# Patient Record
Sex: MALE
[De-identification: names, ages, dates, MRNs, and addresses within clinical notes are randomized per-mention and may not be internally consistent; named-entity substitution may affect disease eponyms.]

---

## 2020-06-18 ENCOUNTER — NURSE TRIAGE (OUTPATIENT)
Dept: OTHER | Facility: CLINIC | Age: 55
End: 2020-06-18

## 2023-02-02 PROBLEM — E55.9 VITAMIN D DEFICIENCY: Status: ACTIVE | Noted: 2023-02-02

## 2023-02-02 PROBLEM — R25.2 SPASTICITY: Status: ACTIVE | Noted: 2023-02-02

## 2023-02-02 PROBLEM — N18.6 END STAGE RENAL DISEASE (MULTI): Status: ACTIVE | Noted: 2023-02-02

## 2023-02-02 PROBLEM — N13.8 BPH WITH OBSTRUCTION/LOWER URINARY TRACT SYMPTOMS: Status: ACTIVE | Noted: 2023-02-02

## 2023-02-02 PROBLEM — R26.81 GAIT INSTABILITY: Status: ACTIVE | Noted: 2023-02-02

## 2023-02-02 PROBLEM — Z94.0 KIDNEY REPLACED BY TRANSPLANT (HHS-HCC): Status: ACTIVE | Noted: 2023-02-02

## 2023-02-02 PROBLEM — M25.562 ACUTE PAIN OF LEFT KNEE: Status: ACTIVE | Noted: 2023-02-02

## 2023-02-02 PROBLEM — B02.30 HERPES ZOSTER WITH OPHTHALMIC COMPLICATION: Status: ACTIVE | Noted: 2023-02-02

## 2023-02-02 PROBLEM — I10 BENIGN ESSENTIAL HTN: Status: ACTIVE | Noted: 2023-02-02

## 2023-02-02 PROBLEM — R42 VERTIGO: Status: ACTIVE | Noted: 2023-02-02

## 2023-02-02 PROBLEM — R94.5 ABNORMAL LIVER FUNCTION: Status: ACTIVE | Noted: 2023-02-02

## 2023-02-02 PROBLEM — F41.9 ACUTE ANXIETY: Status: ACTIVE | Noted: 2023-02-02

## 2023-02-02 PROBLEM — D84.9 IMMUNOSUPPRESSION (MULTI): Status: ACTIVE | Noted: 2023-02-02

## 2023-02-02 PROBLEM — K11.9 LESION OF PAROTID GLAND: Status: ACTIVE | Noted: 2023-02-02

## 2023-02-02 PROBLEM — E23.7 PITUITARY ABNORMALITY (MULTI): Status: ACTIVE | Noted: 2023-02-02

## 2023-02-02 PROBLEM — G20.C PARKINSONISM (MULTI): Status: ACTIVE | Noted: 2023-02-02

## 2023-02-02 PROBLEM — N39.0 ACUTE URINARY TRACT INFECTION: Status: ACTIVE | Noted: 2023-02-02

## 2023-02-02 PROBLEM — E23.6 PITUITARY MASS (MULTI): Status: ACTIVE | Noted: 2023-02-02

## 2023-02-02 PROBLEM — R26.89 LOSS OF BALANCE: Status: ACTIVE | Noted: 2023-02-02

## 2023-02-02 PROBLEM — G95.9 MYELOPATHY (MULTI): Status: ACTIVE | Noted: 2023-02-02

## 2023-02-02 PROBLEM — M54.17 LUMBOSACRAL RADICULOPATHY AT L2: Status: ACTIVE | Noted: 2023-02-02

## 2023-02-02 PROBLEM — M10.9 GOUT: Status: ACTIVE | Noted: 2023-02-02

## 2023-02-02 PROBLEM — K21.9 GERD (GASTROESOPHAGEAL REFLUX DISEASE): Status: ACTIVE | Noted: 2023-02-02

## 2023-02-02 PROBLEM — M25.551 RIGHT HIP PAIN: Status: ACTIVE | Noted: 2023-02-02

## 2023-02-02 PROBLEM — K11.20 SUBMANDIBULAR SIALOADENITIS: Status: ACTIVE | Noted: 2023-02-02

## 2023-02-02 PROBLEM — D86.9 SARCOIDOSIS: Status: ACTIVE | Noted: 2023-02-02

## 2023-02-02 PROBLEM — N40.1 BPH WITH OBSTRUCTION/LOWER URINARY TRACT SYMPTOMS: Status: ACTIVE | Noted: 2023-02-02

## 2023-02-02 PROBLEM — M17.12 PRIMARY OSTEOARTHRITIS OF LEFT KNEE: Status: ACTIVE | Noted: 2023-02-02

## 2023-02-02 PROBLEM — K11.5 PAROTID SIALOLITHIASIS: Status: ACTIVE | Noted: 2023-02-02

## 2023-02-02 PROBLEM — N25.81 SECONDARY HYPERPARATHYROIDISM, RENAL (MULTI): Status: ACTIVE | Noted: 2023-02-02

## 2023-02-02 PROBLEM — M54.17 LUMBOSACRAL RADICULOPATHY AT L5: Status: ACTIVE | Noted: 2023-02-02

## 2023-02-02 PROBLEM — N52.9 ERECTILE DYSFUNCTION: Status: ACTIVE | Noted: 2023-02-02

## 2023-02-02 PROBLEM — R39.11 URINARY HESITANCY: Status: ACTIVE | Noted: 2023-02-02

## 2023-02-02 PROBLEM — R29.898 WEAKNESS OF BOTH LOWER EXTREMITIES: Status: ACTIVE | Noted: 2023-02-02

## 2023-02-02 PROBLEM — G62.9 AXONAL POLYNEUROPATHY: Status: ACTIVE | Noted: 2023-02-02

## 2023-02-02 PROBLEM — E83.52 HYPERCALCEMIA: Status: ACTIVE | Noted: 2023-02-02

## 2023-02-02 PROBLEM — D86.89 SARCOID NEUROPATHY: Status: ACTIVE | Noted: 2023-02-02

## 2023-02-27 LAB
ALBUMIN (G/DL) IN SER/PLAS: 3.9 G/DL (ref 3.4–5)
ANION GAP IN SER/PLAS: 14 MMOL/L (ref 10–20)
CALCIUM (MG/DL) IN SER/PLAS: 10.7 MG/DL (ref 8.6–10.6)
CARBON DIOXIDE, TOTAL (MMOL/L) IN SER/PLAS: 28 MMOL/L (ref 21–32)
CHLORIDE (MMOL/L) IN SER/PLAS: 102 MMOL/L (ref 98–107)
CREATININE (MG/DL) IN SER/PLAS: 0.94 MG/DL (ref 0.5–1.3)
CREATININE (MG/DL) IN URINE: 86.6 MG/DL (ref 20–370)
CYCLOSPORINE (NG/L) IN BLOOD: 91 NG/ML (ref 80–210)
ERYTHROCYTE DISTRIBUTION WIDTH (RATIO) BY AUTOMATED COUNT: 13.7 % (ref 11.5–14.5)
ERYTHROCYTE MEAN CORPUSCULAR HEMOGLOBIN CONCENTRATION (G/DL) BY AUTOMATED: 31.8 G/DL (ref 32–36)
ERYTHROCYTE MEAN CORPUSCULAR VOLUME (FL) BY AUTOMATED COUNT: 99 FL (ref 80–100)
ERYTHROCYTES (10*6/UL) IN BLOOD BY AUTOMATED COUNT: 4.37 X10E12/L (ref 4.5–5.9)
GFR MALE: >90 ML/MIN/1.73M2
GLUCOSE (MG/DL) IN SER/PLAS: 99 MG/DL (ref 74–99)
HEMATOCRIT (%) IN BLOOD BY AUTOMATED COUNT: 43.4 % (ref 41–52)
HEMOGLOBIN (G/DL) IN BLOOD: 13.8 G/DL (ref 13.5–17.5)
LEUKOCYTES (10*3/UL) IN BLOOD BY AUTOMATED COUNT: 5.6 X10E9/L (ref 4.4–11.3)
NRBC (PER 100 WBCS) BY AUTOMATED COUNT: 0 /100 WBC (ref 0–0)
PHOSPHATE (MG/DL) IN SER/PLAS: 3.3 MG/DL (ref 2.5–4.9)
PLATELETS (10*3/UL) IN BLOOD AUTOMATED COUNT: 337 X10E9/L (ref 150–450)
POTASSIUM (MMOL/L) IN SER/PLAS: 4 MMOL/L (ref 3.5–5.3)
PROTEIN (MG/DL) IN URINE: 12 MG/DL (ref 5–25)
PROTEIN/CREATININE (MG/MG) IN URINE: 0.14 MG/MG CREAT (ref 0–0.17)
SODIUM (MMOL/L) IN SER/PLAS: 140 MMOL/L (ref 136–145)
UREA NITROGEN (MG/DL) IN SER/PLAS: 13 MG/DL (ref 6–23)

## 2023-03-16 ENCOUNTER — OFFICE VISIT (OUTPATIENT)
Dept: PRIMARY CARE | Facility: CLINIC | Age: 58
End: 2023-03-16
Payer: COMMERCIAL

## 2023-03-16 VITALS
BODY MASS INDEX: 24.17 KG/M2 | DIASTOLIC BLOOD PRESSURE: 89 MMHG | WEIGHT: 154 LBS | SYSTOLIC BLOOD PRESSURE: 141 MMHG | HEART RATE: 71 BPM | HEIGHT: 67 IN

## 2023-03-16 DIAGNOSIS — D84.9 IMMUNOSUPPRESSION (MULTI): ICD-10-CM

## 2023-03-16 DIAGNOSIS — I10 BENIGN ESSENTIAL HTN: ICD-10-CM

## 2023-03-16 DIAGNOSIS — G62.9 AXONAL POLYNEUROPATHY: ICD-10-CM

## 2023-03-16 DIAGNOSIS — D86.9 SARCOIDOSIS: ICD-10-CM

## 2023-03-16 DIAGNOSIS — Z94.0 KIDNEY REPLACED BY TRANSPLANT (HHS-HCC): ICD-10-CM

## 2023-03-16 DIAGNOSIS — N40.1 BPH WITH OBSTRUCTION/LOWER URINARY TRACT SYMPTOMS: ICD-10-CM

## 2023-03-16 DIAGNOSIS — Z12.11 SCREEN FOR COLON CANCER: Primary | ICD-10-CM

## 2023-03-16 DIAGNOSIS — R29.898 WEAKNESS OF BOTH LOWER EXTREMITIES: ICD-10-CM

## 2023-03-16 DIAGNOSIS — N13.8 BPH WITH OBSTRUCTION/LOWER URINARY TRACT SYMPTOMS: ICD-10-CM

## 2023-03-16 PROCEDURE — 99214 OFFICE O/P EST MOD 30 MIN: CPT | Performed by: FAMILY MEDICINE

## 2023-03-16 PROCEDURE — 1036F TOBACCO NON-USER: CPT | Performed by: FAMILY MEDICINE

## 2023-03-16 PROCEDURE — 3077F SYST BP >= 140 MM HG: CPT | Performed by: FAMILY MEDICINE

## 2023-03-16 PROCEDURE — 3079F DIAST BP 80-89 MM HG: CPT | Performed by: FAMILY MEDICINE

## 2023-03-18 PROBLEM — Z12.11 SCREEN FOR COLON CANCER: Status: ACTIVE | Noted: 2023-03-18

## 2023-03-18 ASSESSMENT — ENCOUNTER SYMPTOMS
FATIGUE: 1
GASTROINTESTINAL NEGATIVE: 1
LIGHT-HEADEDNESS: 1
FREQUENCY: 1
JOINT SWELLING: 1
WEAKNESS: 1
HEMATOLOGIC/LYMPHATIC NEGATIVE: 1
DIZZINESS: 1
DYSURIA: 1
RESPIRATORY NEGATIVE: 1
TREMORS: 1
PSYCHIATRIC NEGATIVE: 1
EYES NEGATIVE: 1

## 2023-03-18 NOTE — PROGRESS NOTES
"Subjective   Patient ID: Mehul Meza is a 57 y.o. male who presents for Follow-up and Joint Swelling.  Today he is accompanied by alone.     Has had edema Had LP by neuro. Due for colonosocpy and shingrix  Difficulty walking feeling weakness balance getting worse.   Saw urology on Tamsulosin now which is helping. Occ dizziness.     Current Outpatient Medications on File Prior to Visit   Medication Sig Dispense Refill   • baclofen (Lioresal) 20 mg tablet Take 1 tablet (20 mg) by mouth in the morning and 1 tablet (20 mg) in the evening and 1 tablet (20 mg) before bedtime.     • cycloSPORINE modified (NEOral) 25 mg capsule Take 3 capsules (75 mg) by mouth in the morning and 3 capsules (75 mg) in the evening.     • ergocalciferol (Vitamin D-2) 1.25 MG (05551 UT) capsule Take 1 capsule (1,250 mcg) by mouth every 30 (thirty) days.     • mycophenolate (Cellcept) 250 mg capsule Take 2 capsules (500 mg) by mouth in the morning and 2 capsules (500 mg) before bedtime.     • nadolol (Corgard) 20 mg tablet Take 1 tablet (20 mg) by mouth once daily.     • predniSONE (Deltasone) 5 mg tablet Take 1 tablet (5 mg) by mouth once daily.     • tamsulosin (Flomax) 0.4 mg 24 hr capsule Take 1 capsule (0.4 mg) by mouth once daily at bedtime.       No current facility-administered medications on file prior to visit.        Review of Systems   Constitutional:  Positive for fatigue.   HENT: Negative.     Eyes: Negative.    Respiratory: Negative.     Cardiovascular:  Positive for leg swelling.   Gastrointestinal: Negative.    Genitourinary:  Positive for dysuria and frequency.   Musculoskeletal:  Positive for joint swelling.   Neurological:  Positive for dizziness, tremors, weakness and light-headedness.   Hematological: Negative.    Psychiatric/Behavioral: Negative.         Objective   /89   Pulse 71   Ht 1.702 m (5' 7\")   Wt 69.9 kg (154 lb)   BMI 24.12 kg/m²   BSA: 1.82 meters squared  Growth percentiles: Facility age limit " for growth %brandie is 20 years. Facility age limit for growth %brandie is 20 years.   No visits with results within 1 Week(s) from this visit.   Latest known visit with results is:   Orders Only on 02/27/2023   Component Date Value Ref Range Status   • Glucose 02/27/2023 99  74 - 99 mg/dL Final   • Sodium 02/27/2023 140  136 - 145 mmol/L Final   • Potassium 02/27/2023 4.0  3.5 - 5.3 mmol/L Final   • Chloride 02/27/2023 102  98 - 107 mmol/L Final   • Bicarbonate 02/27/2023 28  21 - 32 mmol/L Final   • Anion Gap 02/27/2023 14  10 - 20 mmol/L Final   • Urea Nitrogen 02/27/2023 13  6 - 23 mg/dL Final   • Creatinine 02/27/2023 0.94  0.50 - 1.30 mg/dL Final   • GFR MALE 02/27/2023 >90  >90 mL/min/1.73m2 Final    Comment:  CALCULATIONS OF ESTIMATED GFR ARE PERFORMED   USING THE 2021 CKD-EPI STUDY REFIT EQUATION   WITHOUT THE RACE VARIABLE FOR THE IDMS-TRACEABLE   CREATININE METHODS.    https://jasn.asnjournals.org/content/early/2021/09/22/ASN.7303340205     • Calcium 02/27/2023 10.7 (H)  8.6 - 10.6 mg/dL Final   • Phosphorus 02/27/2023 3.3  2.5 - 4.9 mg/dL Final    Comment:  The performance characteristics of phosphorus testing in   heparinized plasma have been validated by the individual     laboratory site where testing is performed. Testing    on heparinized plasma is not approved by the FDA;    however, such approval is not necessary.     • Albumin 02/27/2023 3.9  3.4 - 5.0 g/dL Final      Physical Exam  Vitals and nursing note reviewed.   Constitutional:       Appearance: Normal appearance.   HENT:      Head: Normocephalic and atraumatic.      Right Ear: Tympanic membrane normal.      Left Ear: Tympanic membrane normal.      Nose: Nose normal.      Mouth/Throat:      Mouth: Mucous membranes are moist.   Eyes:      Pupils: Pupils are equal, round, and reactive to light.   Cardiovascular:      Rate and Rhythm: Normal rate and regular rhythm.      Pulses: Normal pulses.      Heart sounds: Normal heart sounds.   Pulmonary:       Effort: Pulmonary effort is normal.      Breath sounds: Normal breath sounds.   Abdominal:      General: Abdomen is flat. Bowel sounds are normal.      Palpations: Abdomen is soft.   Musculoskeletal:      Cervical back: Normal range of motion and neck supple.   Skin:     General: Skin is warm and dry.      Capillary Refill: Capillary refill takes less than 2 seconds.   Neurological:      Mental Status: He is alert and oriented to person, place, and time.      Motor: Weakness present.      Gait: Gait abnormal.   Psychiatric:         Mood and Affect: Mood normal.       Assessment/Plan   Problem List Items Addressed This Visit          Nervous    Axonal polyneuropathy     C/w follow with neurology         Weakness of both lower extremities     Unclear etiology . Work up by neurology            Circulatory    Benign essential HTN     C/w meds BP controlled. Limit sodim increase exercise.            Genitourinary    BPH with obstruction/lower urinary tract symptoms     Tamsulosin in AM working better for patient.             Infectious/Inflammatory    Sarcoidosis       Other    Kidney replaced by transplant     Other Visit Diagnoses       Screen for colon cancer    -  Primary    Relevant Orders    Colonoscopy          [unfilled]    Assessment/Plan   Problem List Items Addressed This Visit          Nervous    Axonal polyneuropathy     C/w follow with neurology         Weakness of both lower extremities     Unclear etiology . Work up by neurology            Circulatory    Benign essential HTN     C/w meds BP controlled. Limit sodim increase exercise.            Genitourinary    BPH with obstruction/lower urinary tract symptoms     Tamsulosin in AM working better for patient.             Infectious/Inflammatory    Sarcoidosis       Other    Kidney replaced by transplant     Other Visit Diagnoses       Screen for colon cancer    -  Primary    Relevant Orders    Colonoscopy

## 2023-04-28 DIAGNOSIS — I10 BENIGN ESSENTIAL HTN: ICD-10-CM

## 2023-04-29 RX ORDER — NADOLOL 20 MG/1
20 TABLET ORAL DAILY
Qty: 90 TABLET | Refills: 1 | Status: SHIPPED | OUTPATIENT
Start: 2023-04-29 | End: 2023-10-23

## 2023-05-05 LAB
ALANINE AMINOTRANSFERASE (SGPT) (U/L) IN SER/PLAS: 19 U/L (ref 10–52)
ALBUMIN (G/DL) IN SER/PLAS: 3.7 G/DL (ref 3.4–5)
ALKALINE PHOSPHATASE (U/L) IN SER/PLAS: 146 U/L (ref 33–120)
ANION GAP IN SER/PLAS: 13 MMOL/L (ref 10–20)
ASPARTATE AMINOTRANSFERASE (SGOT) (U/L) IN SER/PLAS: 27 U/L (ref 9–39)
BILIRUBIN TOTAL (MG/DL) IN SER/PLAS: 1.1 MG/DL (ref 0–1.2)
CALCIUM (MG/DL) IN SER/PLAS: 10.7 MG/DL (ref 8.6–10.6)
CARBON DIOXIDE, TOTAL (MMOL/L) IN SER/PLAS: 28 MMOL/L (ref 21–32)
CHLORIDE (MMOL/L) IN SER/PLAS: 103 MMOL/L (ref 98–107)
CREATININE (MG/DL) IN SER/PLAS: 0.98 MG/DL (ref 0.5–1.3)
ERYTHROCYTE DISTRIBUTION WIDTH (RATIO) BY AUTOMATED COUNT: 13.8 % (ref 11.5–14.5)
ERYTHROCYTE MEAN CORPUSCULAR HEMOGLOBIN CONCENTRATION (G/DL) BY AUTOMATED: 32.6 G/DL (ref 32–36)
ERYTHROCYTE MEAN CORPUSCULAR VOLUME (FL) BY AUTOMATED COUNT: 96 FL (ref 80–100)
ERYTHROCYTES (10*6/UL) IN BLOOD BY AUTOMATED COUNT: 4.59 X10E12/L (ref 4.5–5.9)
GFR MALE: 90 ML/MIN/1.73M2
GLUCOSE (MG/DL) IN SER/PLAS: 92 MG/DL (ref 74–99)
HEMATOCRIT (%) IN BLOOD BY AUTOMATED COUNT: 43.9 % (ref 41–52)
HEMOGLOBIN (G/DL) IN BLOOD: 14.3 G/DL (ref 13.5–17.5)
IRON (UG/DL) IN SER/PLAS: 64 UG/DL (ref 35–150)
IRON BINDING CAPACITY (UG/DL) IN SER/PLAS: 316 UG/DL (ref 240–445)
IRON SATURATION (%) IN SER/PLAS: 20 % (ref 25–45)
LEUKOCYTES (10*3/UL) IN BLOOD BY AUTOMATED COUNT: 6.2 X10E9/L (ref 4.4–11.3)
NRBC (PER 100 WBCS) BY AUTOMATED COUNT: 0 /100 WBC (ref 0–0)
PLATELETS (10*3/UL) IN BLOOD AUTOMATED COUNT: 301 X10E9/L (ref 150–450)
POTASSIUM (MMOL/L) IN SER/PLAS: 4 MMOL/L (ref 3.5–5.3)
PROTEIN TOTAL: 8.2 G/DL (ref 6.4–8.2)
SODIUM (MMOL/L) IN SER/PLAS: 140 MMOL/L (ref 136–145)
THYROTROPIN (MIU/L) IN SER/PLAS BY DETECTION LIMIT <= 0.05 MIU/L: 1.23 MIU/L (ref 0.44–3.98)
UREA NITROGEN (MG/DL) IN SER/PLAS: 12 MG/DL (ref 6–23)

## 2023-06-06 LAB
ALBUMIN (G/DL) IN SER/PLAS: 3.9 G/DL (ref 3.4–5)
ANION GAP IN SER/PLAS: 17 MMOL/L (ref 10–20)
CALCIDIOL (25 OH VITAMIN D3) (NG/ML) IN SER/PLAS: 39 NG/ML
CALCIUM (MG/DL) IN SER/PLAS: 10.6 MG/DL (ref 8.6–10.6)
CARBON DIOXIDE, TOTAL (MMOL/L) IN SER/PLAS: 24 MMOL/L (ref 21–32)
CHLORIDE (MMOL/L) IN SER/PLAS: 101 MMOL/L (ref 98–107)
CREATININE (MG/DL) IN SER/PLAS: 0.93 MG/DL (ref 0.5–1.3)
CREATININE (MG/DL) IN URINE: NORMAL
ERYTHROCYTE DISTRIBUTION WIDTH (RATIO) BY AUTOMATED COUNT: 14.1 % (ref 11.5–14.5)
ERYTHROCYTE MEAN CORPUSCULAR HEMOGLOBIN CONCENTRATION (G/DL) BY AUTOMATED: 32.7 G/DL (ref 32–36)
ERYTHROCYTE MEAN CORPUSCULAR VOLUME (FL) BY AUTOMATED COUNT: 94 FL (ref 80–100)
ERYTHROCYTES (10*6/UL) IN BLOOD BY AUTOMATED COUNT: 4.35 X10E12/L (ref 4.5–5.9)
GFR MALE: >90 ML/MIN/1.73M2
GLUCOSE (MG/DL) IN SER/PLAS: 90 MG/DL (ref 74–99)
HEMATOCRIT (%) IN BLOOD BY AUTOMATED COUNT: 41 % (ref 41–52)
HEMOGLOBIN (G/DL) IN BLOOD: 13.4 G/DL (ref 13.5–17.5)
LEUKOCYTES (10*3/UL) IN BLOOD BY AUTOMATED COUNT: 7.2 X10E9/L (ref 4.4–11.3)
NRBC (PER 100 WBCS) BY AUTOMATED COUNT: 0 /100 WBC (ref 0–0)
PARATHYRIN INTACT (PG/ML) IN SER/PLAS: 106.8 PG/ML (ref 18.5–88)
PHOSPHATE (MG/DL) IN SER/PLAS: 3.5 MG/DL (ref 2.5–4.9)
PLATELETS (10*3/UL) IN BLOOD AUTOMATED COUNT: 291 X10E9/L (ref 150–450)
POTASSIUM (MMOL/L) IN SER/PLAS: 4.1 MMOL/L (ref 3.5–5.3)
PROTEIN (MG/DL) IN URINE: NORMAL
PROTEIN/CREATININE (MG/MG) IN URINE: NORMAL
SODIUM (MMOL/L) IN SER/PLAS: 138 MMOL/L (ref 136–145)
UREA NITROGEN (MG/DL) IN SER/PLAS: 16 MG/DL (ref 6–23)

## 2023-06-07 LAB
CREATININE (MG/DL) IN URINE: 87.1 MG/DL (ref 20–370)
CYCLOSPORINE (NG/L) IN BLOOD: 122 NG/ML (ref 80–210)
PROTEIN (MG/DL) IN URINE: 10 MG/DL (ref 5–25)
PROTEIN/CREATININE (MG/MG) IN URINE: 0.11 MG/MG CREAT (ref 0–0.17)

## 2023-06-19 ENCOUNTER — OFFICE VISIT (OUTPATIENT)
Dept: PRIMARY CARE | Facility: CLINIC | Age: 58
End: 2023-06-19
Payer: COMMERCIAL

## 2023-06-19 VITALS
DIASTOLIC BLOOD PRESSURE: 76 MMHG | HEIGHT: 67 IN | SYSTOLIC BLOOD PRESSURE: 116 MMHG | WEIGHT: 150 LBS | BODY MASS INDEX: 23.54 KG/M2

## 2023-06-19 DIAGNOSIS — D86.89 SARCOID NEUROPATHY: ICD-10-CM

## 2023-06-19 DIAGNOSIS — Z00.00 ROUTINE GENERAL MEDICAL EXAMINATION AT A HEALTH CARE FACILITY: Primary | ICD-10-CM

## 2023-06-19 DIAGNOSIS — G95.9 MYELOPATHY (MULTI): ICD-10-CM

## 2023-06-19 DIAGNOSIS — I10 BENIGN ESSENTIAL HTN: ICD-10-CM

## 2023-06-19 DIAGNOSIS — G62.9 AXONAL POLYNEUROPATHY: ICD-10-CM

## 2023-06-19 DIAGNOSIS — N18.6 END STAGE RENAL DISEASE (MULTI): ICD-10-CM

## 2023-06-19 DIAGNOSIS — Z12.5 PROSTATE CANCER SCREENING: ICD-10-CM

## 2023-06-19 PROBLEM — G11.4 HEREDITARY SPASTIC PARAPLEGIA (MULTI): Status: RESOLVED | Noted: 2023-06-19 | Resolved: 2023-06-19

## 2023-06-19 PROBLEM — D84.9 IMMUNOSUPPRESSION (MULTI): Status: RESOLVED | Noted: 2023-02-02 | Resolved: 2023-06-19

## 2023-06-19 PROBLEM — D84.9 IMMUNOSUPPRESSION (MULTI): Status: ACTIVE | Noted: 2023-06-19

## 2023-06-19 PROBLEM — G20.C PARKINSONISM (MULTI): Status: ACTIVE | Noted: 2023-06-19

## 2023-06-19 PROCEDURE — 3078F DIAST BP <80 MM HG: CPT | Performed by: FAMILY MEDICINE

## 2023-06-19 PROCEDURE — 99214 OFFICE O/P EST MOD 30 MIN: CPT | Performed by: FAMILY MEDICINE

## 2023-06-19 PROCEDURE — 1036F TOBACCO NON-USER: CPT | Performed by: FAMILY MEDICINE

## 2023-06-19 PROCEDURE — 3074F SYST BP LT 130 MM HG: CPT | Performed by: FAMILY MEDICINE

## 2023-06-24 PROBLEM — Z12.5 PROSTATE CANCER SCREENING: Status: ACTIVE | Noted: 2023-06-24

## 2023-06-24 PROBLEM — Z00.00 ROUTINE GENERAL MEDICAL EXAMINATION AT A HEALTH CARE FACILITY: Status: ACTIVE | Noted: 2023-06-24

## 2023-06-24 ASSESSMENT — ENCOUNTER SYMPTOMS
BACK PAIN: 1
FREQUENCY: 1
DYSURIA: 1
WEAKNESS: 1
FATIGUE: 1
EYES NEGATIVE: 1
LIGHT-HEADEDNESS: 1
TREMORS: 1
JOINT SWELLING: 1
GASTROINTESTINAL NEGATIVE: 1
PSYCHIATRIC NEGATIVE: 1
DIZZINESS: 1
RESPIRATORY NEGATIVE: 1
HEMATOLOGIC/LYMPHATIC NEGATIVE: 1

## 2023-06-24 ASSESSMENT — PATIENT HEALTH QUESTIONNAIRE - PHQ9
1. LITTLE INTEREST OR PLEASURE IN DOING THINGS: NOT AT ALL
2. FEELING DOWN, DEPRESSED OR HOPELESS: NOT AT ALL
SUM OF ALL RESPONSES TO PHQ9 QUESTIONS 1 AND 2: 0

## 2023-06-24 NOTE — PROGRESS NOTES
"Subjective   Patient ID: Mehul Meza is a 57 y.o. male who presents for Follow-up (3 month ) and Back Pain (Worsening hard to walk ).  Today he is accompanied by alone.     Has had edema Had LP by neuro. Due for colonosocpy and shingrix  Difficulty walking feeling weakness balance getting worse.   Saw urology on Tamsulosin now which is helping. Occ dizziness.     Back Pain  Associated symptoms include dysuria and weakness.     Current Outpatient Medications on File Prior to Visit   Medication Sig Dispense Refill    baclofen (Lioresal) 20 mg tablet Take 1 tablet (20 mg) by mouth 3 times a day.      cycloSPORINE modified (NEOral) 25 mg capsule Take 3 capsules (75 mg) by mouth every 12 hours.      ergocalciferol (Vitamin D-2) 1.25 MG (06996 UT) capsule Take 1 capsule (1,250 mcg) by mouth every 30 (thirty) days.      mycophenolate (Cellcept) 250 mg capsule Take 2 capsules (500 mg) by mouth 2 times a day.      nadolol (Corgard) 20 mg tablet Take 1 tablet (20 mg) by mouth once daily. 90 tablet 1    predniSONE (Deltasone) 5 mg tablet Take 1 tablet (5 mg) by mouth once daily.      tamsulosin (Flomax) 0.4 mg 24 hr capsule Take 1 capsule (0.4 mg) by mouth once daily at bedtime.       No current facility-administered medications on file prior to visit.        Review of Systems   Constitutional:  Positive for fatigue.   HENT: Negative.     Eyes: Negative.    Respiratory: Negative.     Cardiovascular:  Positive for leg swelling.   Gastrointestinal: Negative.    Genitourinary:  Positive for dysuria and frequency.   Musculoskeletal:  Positive for back pain and joint swelling.   Neurological:  Positive for dizziness, tremors, weakness and light-headedness.   Hematological: Negative.    Psychiatric/Behavioral: Negative.         Objective   Blood Pressure 116/76   Height 1.702 m (5' 7\")   Weight 68 kg (150 lb)   Body Mass Index 23.49 kg/m²   BSA: 1.79 meters squared  Growth percentiles: Facility age limit for growth %brandie is " 20 years. Facility age limit for growth %brandie is 20 years.   No visits with results within 1 Week(s) from this visit.   Latest known visit with results is:   Orders Only on 06/06/2023   Component Date Value Ref Range Status    Genetics Test Name 06/06/2023 GeneDx   Final    WBC 06/06/2023 7.2  4.4 - 11.3 x10E9/L Final    nRBC 06/06/2023 0.0  0.0 - 0.0 /100 WBC Final    RBC 06/06/2023 4.35 (L)  4.50 - 5.90 x10E12/L Final    Hemoglobin 06/06/2023 13.4 (L)  13.5 - 17.5 g/dL Final    Hematocrit 06/06/2023 41.0  41.0 - 52.0 % Final    MCV 06/06/2023 94  80 - 100 fL Final    MCHC 06/06/2023 32.7  32.0 - 36.0 g/dL Final    Platelets 06/06/2023 291  150 - 450 x10E9/L Final    RDW 06/06/2023 14.1  11.5 - 14.5 % Final    PTH 06/06/2023 106.8 (H)  18.5 - 88.0 pg/mL Final    Vitamin D, 25-Hydroxy 06/06/2023 39  ng/mL Final    Comment: .  DEFICIENCY:         < 20   NG/ML  INSUFFICIENCY:      20-29  NG/ML  SUFFICIENCY:         NG/ML    THIS ASSAY ACCURATELY QUANTIFIES THE SUM OF  VITAMIN D3, 25-HYDROXY AND VIT D2,25-HYDROXY.      Glucose 06/06/2023 90  74 - 99 mg/dL Final    Sodium 06/06/2023 138  136 - 145 mmol/L Final    Potassium 06/06/2023 4.1  3.5 - 5.3 mmol/L Final    Chloride 06/06/2023 101  98 - 107 mmol/L Final    Bicarbonate 06/06/2023 24  21 - 32 mmol/L Final    Anion Gap 06/06/2023 17  10 - 20 mmol/L Final    Urea Nitrogen 06/06/2023 16  6 - 23 mg/dL Final    Creatinine 06/06/2023 0.93  0.50 - 1.30 mg/dL Final    GFR MALE 06/06/2023 >90  >90 mL/min/1.73m2 Final    Comment:  CALCULATIONS OF ESTIMATED GFR ARE PERFORMED   USING THE 2021 CKD-EPI STUDY REFIT EQUATION   WITHOUT THE RACE VARIABLE FOR THE IDMS-TRACEABLE   CREATININE METHODS.    https://jasn.asnjournals.org/content/early/2021/09/22/ASN.1766677626      Calcium 06/06/2023 10.6  8.6 - 10.6 mg/dL Final    Phosphorus 06/06/2023 3.5  2.5 - 4.9 mg/dL Final    Comment:  The performance characteristics of phosphorus testing in   heparinized plasma have been  validated by the individual     laboratory site where testing is performed. Testing    on heparinized plasma is not approved by the FDA;    however, such approval is not necessary.      Albumin 06/06/2023 3.9  3.4 - 5.0 g/dL Final    Cyclosporine, Blood 06/06/2023 122  80 - 210 ng/mL Final    Comment: NOTE: RESULT WAS OBTAINED USING A   CHEMILUMINESCENT MICROPARTICLE IMMUNOASSAY  (CMIA) ON THE  i SYSTEM.    Optimal therapeutic ranges for immuno-  suppressant drugs depend upon an individual  patient's current clinical state, type of  organ transplant, time post-transplant,  co-administration of other immunosuppressants,  and other clinical factors. The results of  this test should be correlated with additional  clinical and laboratory data before changes  in treatment regimens are made.      Protein, Ur 06/06/2023 CANCELED   Final-Edited    Result canceled by the ancillary.    Creatinine, Urine 06/06/2023 CANCELED   Final-Edited    Result canceled by the ancillary.    Prot/Creat, Ur 06/06/2023 CANCELED   Final-Edited    Result canceled by the ancillary.    Protein, Ur 06/06/2023 10  5 - 25 mg/dL Final    Creatinine, Urine 06/06/2023 87.1  20.0 - 370.0 mg/dL Final    Prot/Creat, Ur 06/06/2023 0.11  0.00 - 0.17 mg/mg Creat Final      Physical Exam  Vitals and nursing note reviewed.   Constitutional:       Appearance: Normal appearance.   HENT:      Head: Normocephalic and atraumatic.      Right Ear: Tympanic membrane normal.      Left Ear: Tympanic membrane normal.      Nose: Nose normal.      Mouth/Throat:      Mouth: Mucous membranes are moist.   Eyes:      Pupils: Pupils are equal, round, and reactive to light.   Cardiovascular:      Rate and Rhythm: Normal rate and regular rhythm.      Pulses: Normal pulses.      Heart sounds: Normal heart sounds.   Pulmonary:      Effort: Pulmonary effort is normal.      Breath sounds: Normal breath sounds.   Abdominal:      General: Abdomen is flat. Bowel sounds are  normal.      Palpations: Abdomen is soft.   Musculoskeletal:      Cervical back: Normal range of motion and neck supple.   Skin:     General: Skin is warm and dry.      Capillary Refill: Capillary refill takes less than 2 seconds.   Neurological:      Mental Status: He is alert and oriented to person, place, and time.      Motor: Weakness present.      Gait: Gait abnormal.   Psychiatric:         Mood and Affect: Mood normal.         Assessment/Plan   Problem List Items Addressed This Visit       Benign essential HTN     Continue medication         End stage renal disease (CMS/HCC)     Post transplant continue follow-up with nephrology         Axonal polyneuropathy    Myelopathy (CMS/HCC)     Continue follow-up with neurology         Sarcoid neuropathy    Routine general medical examination at a health care facility - Primary    Relevant Orders    Lipid Panel    Prostate cancer screening    Relevant Orders    Prostate Specific Antigen, Screen     [unfilled]    Assessment/Plan   Problem List Items Addressed This Visit       Benign essential HTN     Continue medication         End stage renal disease (CMS/HCC)     Post transplant continue follow-up with nephrology         Axonal polyneuropathy    Myelopathy (CMS/HCC)     Continue follow-up with neurology         Sarcoid neuropathy    Routine general medical examination at a health care facility - Primary    Relevant Orders    Lipid Panel    Prostate cancer screening    Relevant Orders    Prostate Specific Antigen, Screen

## 2023-06-28 ENCOUNTER — TELEPHONE (OUTPATIENT)
Dept: PRIMARY CARE | Facility: CLINIC | Age: 58
End: 2023-06-28
Payer: COMMERCIAL

## 2023-06-28 DIAGNOSIS — R29.898 WEAKNESS OF BOTH LOWER EXTREMITIES: ICD-10-CM

## 2023-06-28 DIAGNOSIS — M17.12 PRIMARY OSTEOARTHRITIS OF LEFT KNEE: ICD-10-CM

## 2023-07-11 LAB
GENETICS TEST NAME-DATA CONVERSION: NORMAL
LAB MOLECULAR CA TECHNICAL NOTES: NORMAL

## 2023-09-02 LAB
ALBUMIN (G/DL) IN SER/PLAS: 3.8 G/DL (ref 3.4–5)
ANION GAP IN SER/PLAS: 12 MMOL/L (ref 10–20)
CALCIDIOL (25 OH VITAMIN D3) (NG/ML) IN SER/PLAS: 35 NG/ML
CALCIUM (MG/DL) IN SER/PLAS: 10.8 MG/DL (ref 8.6–10.6)
CARBON DIOXIDE, TOTAL (MMOL/L) IN SER/PLAS: 27 MMOL/L (ref 21–32)
CHLORIDE (MMOL/L) IN SER/PLAS: 101 MMOL/L (ref 98–107)
CREATININE (MG/DL) IN SER/PLAS: 0.95 MG/DL (ref 0.5–1.3)
CREATININE (MG/DL) IN URINE: 54 MG/DL (ref 20–370)
ERYTHROCYTE DISTRIBUTION WIDTH (RATIO) BY AUTOMATED COUNT: 13.9 % (ref 11.5–14.5)
ERYTHROCYTE MEAN CORPUSCULAR HEMOGLOBIN CONCENTRATION (G/DL) BY AUTOMATED: 32.3 G/DL (ref 32–36)
ERYTHROCYTE MEAN CORPUSCULAR VOLUME (FL) BY AUTOMATED COUNT: 97 FL (ref 80–100)
ERYTHROCYTES (10*6/UL) IN BLOOD BY AUTOMATED COUNT: 4.51 X10E12/L (ref 4.5–5.9)
GFR MALE: >90 ML/MIN/1.73M2
GLUCOSE (MG/DL) IN SER/PLAS: 101 MG/DL (ref 74–99)
HEMATOCRIT (%) IN BLOOD BY AUTOMATED COUNT: 43.6 % (ref 41–52)
HEMOGLOBIN (G/DL) IN BLOOD: 14.1 G/DL (ref 13.5–17.5)
LEUKOCYTES (10*3/UL) IN BLOOD BY AUTOMATED COUNT: 5.7 X10E9/L (ref 4.4–11.3)
MISCELLANEUOUS TEST RESULT: NORMAL
NAME OF SENDOUT TEST: NORMAL
NRBC (PER 100 WBCS) BY AUTOMATED COUNT: 0 /100 WBC (ref 0–0)
PARATHYRIN INTACT (PG/ML) IN SER/PLAS: 97.1 PG/ML (ref 18.5–88)
PHOSPHATE (MG/DL) IN SER/PLAS: 3.3 MG/DL (ref 2.5–4.9)
PLATELETS (10*3/UL) IN BLOOD AUTOMATED COUNT: 323 X10E9/L (ref 150–450)
POTASSIUM (MMOL/L) IN SER/PLAS: 4.4 MMOL/L (ref 3.5–5.3)
PROTEIN (MG/DL) IN URINE: 16 MG/DL (ref 5–25)
PROTEIN/CREATININE (MG/MG) IN URINE: 0.3 MG/MG CREAT (ref 0–0.17)
SODIUM (MMOL/L) IN SER/PLAS: 136 MMOL/L (ref 136–145)
UREA NITROGEN (MG/DL) IN SER/PLAS: 15 MG/DL (ref 6–23)

## 2023-09-03 LAB
CYCLOSPORINE (NG/L) IN BLOOD: 102 NG/ML (ref 80–210)
EBV PCR PLASMA LOG IU/ML: NORMAL LOG IU/ML
EBV PCR, QUANT, PLASMA: NOT DETECTED IU/ML

## 2023-09-06 LAB — HTLV 1/2 ANTIBODIES: NEGATIVE

## 2023-09-09 LAB
APPEARANCE, URINE: CLEAR
BILIRUBIN, URINE: NEGATIVE
BLOOD, URINE: NEGATIVE
COLOR, URINE: YELLOW
GLUCOSE, URINE: NEGATIVE MG/DL
KETONES, URINE: NEGATIVE MG/DL
LEUKOCYTE ESTERASE, URINE: ABNORMAL
NITRITE, URINE: NEGATIVE
PH, URINE: 7 (ref 5–8)
PROTEIN, URINE: NEGATIVE MG/DL
RBC, URINE: 1 /HPF (ref 0–5)
SPECIFIC GRAVITY, URINE: 1.01 (ref 1–1.03)
SQUAMOUS EPITHELIAL CELLS, URINE: <1 /HPF
UROBILINOGEN, URINE: 2 MG/DL (ref 0–1.9)
WBC, URINE: 19 /HPF (ref 0–5)

## 2023-09-11 LAB — URINE CULTURE: ABNORMAL

## 2023-09-16 LAB
ALBUMIN (G/DL) IN SER/PLAS: 3.7 G/DL (ref 3.4–5)
ANION GAP IN SER/PLAS: 10 MMOL/L (ref 10–20)
CALCIDIOL (25 OH VITAMIN D3) (NG/ML) IN SER/PLAS: 32 NG/ML
CALCIUM (MG/DL) IN SER/PLAS: 10.5 MG/DL (ref 8.6–10.6)
CARBON DIOXIDE, TOTAL (MMOL/L) IN SER/PLAS: 30 MMOL/L (ref 21–32)
CHLORIDE (MMOL/L) IN SER/PLAS: 104 MMOL/L (ref 98–107)
CREATININE (MG/DL) IN SER/PLAS: 0.96 MG/DL (ref 0.5–1.3)
ERYTHROCYTE DISTRIBUTION WIDTH (RATIO) BY AUTOMATED COUNT: 13.9 % (ref 11.5–14.5)
ERYTHROCYTE MEAN CORPUSCULAR HEMOGLOBIN CONCENTRATION (G/DL) BY AUTOMATED: 32 G/DL (ref 32–36)
ERYTHROCYTE MEAN CORPUSCULAR VOLUME (FL) BY AUTOMATED COUNT: 97 FL (ref 80–100)
ERYTHROCYTES (10*6/UL) IN BLOOD BY AUTOMATED COUNT: 4.53 X10E12/L (ref 4.5–5.9)
GFR MALE: >90 ML/MIN/1.73M2
GLUCOSE (MG/DL) IN SER/PLAS: 87 MG/DL (ref 74–99)
HEMATOCRIT (%) IN BLOOD BY AUTOMATED COUNT: 44.1 % (ref 41–52)
HEMOGLOBIN (G/DL) IN BLOOD: 14.1 G/DL (ref 13.5–17.5)
LEUKOCYTES (10*3/UL) IN BLOOD BY AUTOMATED COUNT: 5.4 X10E9/L (ref 4.4–11.3)
MAGNESIUM (MG/DL) IN SER/PLAS: 1.74 MG/DL (ref 1.6–2.4)
NRBC (PER 100 WBCS) BY AUTOMATED COUNT: 0 /100 WBC (ref 0–0)
PARATHYRIN INTACT (PG/ML) IN SER/PLAS: 98.3 PG/ML (ref 18.5–88)
PHOSPHATE (MG/DL) IN SER/PLAS: 3.2 MG/DL (ref 2.5–4.9)
PLATELETS (10*3/UL) IN BLOOD AUTOMATED COUNT: 349 X10E9/L (ref 150–450)
POTASSIUM (MMOL/L) IN SER/PLAS: 3.8 MMOL/L (ref 3.5–5.3)
PROSTATE SPECIFIC AG (NG/ML) IN SER/PLAS: 0.28 NG/ML (ref 0–4)
SODIUM (MMOL/L) IN SER/PLAS: 140 MMOL/L (ref 136–145)
TACROLIMUS (NG/ML) IN BLOOD: <2 NG/ML (ref 2–15)
UREA NITROGEN (MG/DL) IN SER/PLAS: 14 MG/DL (ref 6–23)

## 2023-10-13 PROBLEM — N18.4 CHRONIC KIDNEY DISEASE, STAGE IV (SEVERE) (MULTI): Status: ACTIVE | Noted: 2023-10-13

## 2023-10-13 PROBLEM — E44.0 MALNUTRITION OF MODERATE DEGREE (MULTI): Status: ACTIVE | Noted: 2023-10-09

## 2023-10-16 ENCOUNTER — APPOINTMENT (OUTPATIENT)
Dept: PRIMARY CARE | Facility: CLINIC | Age: 58
End: 2023-10-16
Payer: COMMERCIAL

## 2023-10-21 DIAGNOSIS — I10 BENIGN ESSENTIAL HTN: ICD-10-CM

## 2023-10-23 RX ORDER — NADOLOL 20 MG/1
20 TABLET ORAL DAILY
Qty: 90 TABLET | Refills: 1 | Status: SHIPPED | OUTPATIENT
Start: 2023-10-23

## 2023-11-16 DIAGNOSIS — N40.1 BENIGN PROSTATIC HYPERPLASIA WITH LOWER URINARY TRACT SYMPTOMS: ICD-10-CM

## 2023-11-16 DIAGNOSIS — N13.8 OTHER OBSTRUCTIVE AND REFLUX UROPATHY: ICD-10-CM

## 2023-11-16 DIAGNOSIS — Z92.25 PERSONAL HISTORY OF IMMUNOSUPRESSION THERAPY: Primary | ICD-10-CM

## 2023-11-16 RX ORDER — TAMSULOSIN HYDROCHLORIDE 0.4 MG/1
0.4 CAPSULE ORAL NIGHTLY
Qty: 90 CAPSULE | Refills: 0 | Status: SHIPPED | OUTPATIENT
Start: 2023-11-16

## 2023-11-26 DIAGNOSIS — Z94.0 KIDNEY REPLACED BY TRANSPLANT (HHS-HCC): ICD-10-CM

## 2023-11-26 RX ORDER — CYCLOSPORINE 25 MG/1
75 CAPSULE, LIQUID FILLED ORAL EVERY 12 HOURS
Qty: 180 CAPSULE | Refills: 11 | Status: SHIPPED | OUTPATIENT
Start: 2023-11-26 | End: 2023-11-27 | Stop reason: SDUPTHER

## 2023-11-26 NOTE — TELEPHONE ENCOUNTER
Received call from patient's sister. She reports he is in a rehab facility after a fall and they let her know today that he took his last dose of cyclosporine last night. Facility is able to get all medications except cyclosporine. Confirmed patient is still taking 75mg twice a day. Refills pended for Dr Vazquez's signature to go to [facility] local to his sister on [address]. Encouraged her to page again if any difficulty getting prescription.

## 2023-11-27 ENCOUNTER — TELEPHONE (OUTPATIENT)
Dept: TRANSPLANT | Facility: HOSPITAL | Age: 58
End: 2023-11-27
Payer: COMMERCIAL

## 2023-11-27 DIAGNOSIS — Z94.0 KIDNEY REPLACED BY TRANSPLANT (HHS-HCC): ICD-10-CM

## 2023-11-27 RX ORDER — CYCLOSPORINE 25 MG/1
75 CAPSULE, LIQUID FILLED ORAL EVERY 12 HOURS
Qty: 180 CAPSULE | Refills: 11 | Status: SHIPPED | OUTPATIENT
Start: 2023-11-27 | End: 2024-11-26

## 2023-11-28 ENCOUNTER — PHARMACY VISIT (OUTPATIENT)
Dept: PHARMACY | Facility: CLINIC | Age: 58
End: 2023-11-28

## 2023-11-28 PROCEDURE — RXMED WILLOW AMBULATORY MEDICATION CHARGE

## 2023-11-28 NOTE — TELEPHONE ENCOUNTER
[facility]ed [facility] spoke with Josr ALVARADO she confirmed medication CSA is ready to ship pt pr sister needs to call   Sister is notified   She states her  will  CSA from local pharmacy too.   Patient had a fall and now [facility] in [place]

## 2023-11-28 NOTE — TELEPHONE ENCOUNTER
Called sister to follow up and discussed CSA option to get for cash which is 39.67 for 60 capsules.   LVM asking for sister to pay for medication.   [facility]ed [facility] spoke with Raj representative, who informed coordinator pt or sister needs to call and scheduled delivery. [facility] is aware pt is out of medication and will expedite shipping overnight. Tel# needs to call [phone], RX#[phone]  Called sister again

## 2023-11-28 NOTE — TELEPHONE ENCOUNTER
ON CALL: Received pt sister call asking for refill of CSA pt has been out for two days. She said script was supposed to be sent to  [facility]. Coordinator verified. Script was sent to [facility]. Script was re sent emailed to Dr. Ingram asking to sign medication stat.   Primary coordinator to follow up in am with [facility].

## 2023-11-30 RX ORDER — CYCLOSPORINE 25 MG/1
75 CAPSULE, LIQUID FILLED ORAL EVERY 12 HOURS
Qty: 180 CAPSULE | Refills: 11 | Status: SHIPPED | OUTPATIENT
Start: 2023-11-30 | End: 2024-11-29

## 2024-02-12 ENCOUNTER — TELEPHONE (OUTPATIENT)
Dept: TRANSPLANT | Facility: HOSPITAL | Age: 59
End: 2024-02-12

## 2024-02-20 RX ORDER — PREDNISONE 5 MG/1
5 TABLET ORAL DAILY
Qty: 90 TABLET | Refills: 3 | Status: SHIPPED | OUTPATIENT
Start: 2024-02-20

## 2024-02-21 ENCOUNTER — APPOINTMENT (OUTPATIENT)
Dept: TRANSPLANT | Facility: HOSPITAL | Age: 59
End: 2024-02-21
Payer: COMMERCIAL

## 2024-08-11 DIAGNOSIS — Z94.0 KIDNEY REPLACED BY TRANSPLANT (HHS-HCC): Primary | ICD-10-CM

## 2024-08-11 RX ORDER — MYCOPHENOLATE MOFETIL 250 MG/1
500 CAPSULE ORAL 2 TIMES DAILY
Qty: 120 CAPSULE | Refills: 11 | Status: SHIPPED | OUTPATIENT
Start: 2024-08-11

## 2024-08-11 NOTE — TELEPHONE ENCOUNTER
ON CALL:  Pt paged reporting he is completely out of MMF, reports he takes 500 mg BID. Confirmed that [facility] and he will pick it up tonight. Rx pended for MD to sign.

## 2024-08-26 ENCOUNTER — TELEPHONE (OUTPATIENT)
Dept: TRANSPLANT | Facility: HOSPITAL | Age: 59
End: 2024-08-26
Payer: COMMERCIAL

## 2024-08-27 NOTE — TELEPHONE ENCOUNTER
On Call: Patient called this evening reporting he needs refills for medications.  Per patient, he needs a refill for Mycophenolate (patient reports he has 2 weeks left), Corgard (patient reports he has a few days of medication left), and Prednisone (patient reports he is completely out).  Advised patient that he should have refills of Prednisone left [facility] as this was filled in February for 90 days plus 3 refills - he will call [facility] to have filled.  Also let patient know primary coordinator would be notified so prescriptions for Mycophenolate and Corgard could be placed in the morning.  Provided patient with [facility] contact information.  He verbalized an understanding of our conversation.

## 2024-08-31 ENCOUNTER — DOCUMENTATION (OUTPATIENT)
Dept: TRANSPLANT | Facility: HOSPITAL | Age: 59
End: 2024-08-31
Payer: COMMERCIAL

## 2024-08-31 DIAGNOSIS — Z94.0 KIDNEY REPLACED BY TRANSPLANT (HHS-HCC): Primary | ICD-10-CM

## 2024-08-31 DIAGNOSIS — I10 BENIGN ESSENTIAL HTN: ICD-10-CM

## 2024-08-31 RX ORDER — BACLOFEN 20 MG/1
20 TABLET ORAL 3 TIMES DAILY
Qty: 90 TABLET | Refills: 3 | Status: SHIPPED | OUTPATIENT
Start: 2024-08-31 | End: 2025-08-26

## 2024-08-31 RX ORDER — NADOLOL 20 MG/1
20 TABLET ORAL DAILY
Qty: 90 TABLET | Refills: 2 | Status: SHIPPED | OUTPATIENT
Start: 2024-08-31

## 2024-08-31 NOTE — PROGRESS NOTES
Patient called on call coordinator: Needs refills for Baclofen and Corgard.  Dr. Barton notified- refills sent to [facility] pharmacy

## 2024-09-11 ENCOUNTER — APPOINTMENT (OUTPATIENT)
Dept: RADIOLOGY | Facility: HOSPITAL | Age: 59
End: 2024-09-11
Payer: MEDICAID

## 2024-09-11 ENCOUNTER — HOSPITAL ENCOUNTER (EMERGENCY)
Facility: HOSPITAL | Age: 59
Discharge: HOME | End: 2024-09-11
Attending: EMERGENCY MEDICINE
Payer: MEDICAID

## 2024-09-11 ENCOUNTER — APPOINTMENT (OUTPATIENT)
Dept: CARDIOLOGY | Facility: HOSPITAL | Age: 59
End: 2024-09-11
Payer: MEDICAID

## 2024-09-11 VITALS
RESPIRATION RATE: 18 BRPM | OXYGEN SATURATION: 98 % | DIASTOLIC BLOOD PRESSURE: 85 MMHG | HEART RATE: 74 BPM | TEMPERATURE: 98.5 F | SYSTOLIC BLOOD PRESSURE: 155 MMHG | HEIGHT: 67 IN | BODY MASS INDEX: 24.48 KG/M2 | WEIGHT: 156 LBS

## 2024-09-11 DIAGNOSIS — R53.1 GENERALIZED WEAKNESS: Primary | ICD-10-CM

## 2024-09-11 LAB
ALBUMIN SERPL BCP-MCNC: 3 G/DL (ref 3.4–5)
AMORPH CRY #/AREA UR COMP ASSIST: ABNORMAL /HPF
ANION GAP SERPL CALC-SCNC: 11 MMOL/L (ref 10–20)
APPEARANCE UR: CLEAR
BACTERIA #/AREA URNS AUTO: ABNORMAL /HPF
BASOPHILS # BLD AUTO: 0.05 X10*3/UL (ref 0–0.1)
BASOPHILS NFR BLD AUTO: 1.1 %
BILIRUB UR STRIP.AUTO-MCNC: NEGATIVE MG/DL
BNP SERPL-MCNC: 217 PG/ML (ref 0–99)
BUN SERPL-MCNC: 13 MG/DL (ref 6–23)
CALCIUM SERPL-MCNC: 9.8 MG/DL (ref 8.6–10.3)
CARDIAC TROPONIN I PNL SERPL HS: 8 NG/L (ref 0–20)
CARDIAC TROPONIN I PNL SERPL HS: 8 NG/L (ref 0–20)
CHLORIDE SERPL-SCNC: 104 MMOL/L (ref 98–107)
CO2 SERPL-SCNC: 28 MMOL/L (ref 21–32)
COLOR UR: NORMAL
CREAT SERPL-MCNC: 0.86 MG/DL (ref 0.5–1.3)
EGFRCR SERPLBLD CKD-EPI 2021: >90 ML/MIN/1.73M*2
EOSINOPHIL # BLD AUTO: 0.19 X10*3/UL (ref 0–0.7)
EOSINOPHIL NFR BLD AUTO: 4.1 %
ERYTHROCYTE [DISTWIDTH] IN BLOOD BY AUTOMATED COUNT: 13.4 % (ref 11.5–14.5)
GLUCOSE SERPL-MCNC: 85 MG/DL (ref 74–99)
GLUCOSE UR STRIP.AUTO-MCNC: NORMAL MG/DL
HCT VFR BLD AUTO: 42.9 % (ref 41–52)
HGB BLD-MCNC: 14.2 G/DL (ref 13.5–17.5)
IMM GRANULOCYTES # BLD AUTO: 0.01 X10*3/UL (ref 0–0.7)
IMM GRANULOCYTES NFR BLD AUTO: 0.2 % (ref 0–0.9)
KETONES UR STRIP.AUTO-MCNC: NEGATIVE MG/DL
LEUKOCYTE ESTERASE UR QL STRIP.AUTO: NEGATIVE
LYMPHOCYTES # BLD AUTO: 1.4 X10*3/UL (ref 1.2–4.8)
LYMPHOCYTES NFR BLD AUTO: 30.6 %
MCH RBC QN AUTO: 31.6 PG (ref 26–34)
MCHC RBC AUTO-ENTMCNC: 33.1 G/DL (ref 32–36)
MCV RBC AUTO: 95 FL (ref 80–100)
MONOCYTES # BLD AUTO: 0.93 X10*3/UL (ref 0.1–1)
MONOCYTES NFR BLD AUTO: 20.3 %
MUCOUS THREADS #/AREA URNS AUTO: ABNORMAL /LPF
NEUTROPHILS # BLD AUTO: 2 X10*3/UL (ref 1.2–7.7)
NEUTROPHILS NFR BLD AUTO: 43.7 %
NITRITE UR QL STRIP.AUTO: NEGATIVE
NRBC BLD-RTO: 0 /100 WBCS (ref 0–0)
PH UR STRIP.AUTO: 5.5 [PH]
PHOSPHATE SERPL-MCNC: 2.6 MG/DL (ref 2.5–4.9)
PLATELET # BLD AUTO: 220 X10*3/UL (ref 150–450)
POTASSIUM SERPL-SCNC: 3.7 MMOL/L (ref 3.5–5.3)
PROT UR STRIP.AUTO-MCNC: NORMAL MG/DL
RBC # BLD AUTO: 4.5 X10*6/UL (ref 4.5–5.9)
RBC # UR STRIP.AUTO: NEGATIVE /UL
RBC #/AREA URNS AUTO: ABNORMAL /HPF
SODIUM SERPL-SCNC: 139 MMOL/L (ref 136–145)
SP GR UR STRIP.AUTO: 1.01
SQUAMOUS #/AREA URNS AUTO: ABNORMAL /HPF
UROBILINOGEN UR STRIP.AUTO-MCNC: NORMAL MG/DL
WBC # BLD AUTO: 4.6 X10*3/UL (ref 4.4–11.3)
WBC #/AREA URNS AUTO: ABNORMAL /HPF

## 2024-09-11 PROCEDURE — 83880 ASSAY OF NATRIURETIC PEPTIDE: CPT | Performed by: EMERGENCY MEDICINE

## 2024-09-11 PROCEDURE — 2500000004 HC RX 250 GENERAL PHARMACY W/ HCPCS (ALT 636 FOR OP/ED): Performed by: EMERGENCY MEDICINE

## 2024-09-11 PROCEDURE — 36415 COLL VENOUS BLD VENIPUNCTURE: CPT | Performed by: EMERGENCY MEDICINE

## 2024-09-11 PROCEDURE — 84484 ASSAY OF TROPONIN QUANT: CPT | Performed by: EMERGENCY MEDICINE

## 2024-09-11 PROCEDURE — 71045 X-RAY EXAM CHEST 1 VIEW: CPT

## 2024-09-11 PROCEDURE — 81003 URINALYSIS AUTO W/O SCOPE: CPT | Performed by: EMERGENCY MEDICINE

## 2024-09-11 PROCEDURE — 2500000001 HC RX 250 WO HCPCS SELF ADMINISTERED DRUGS (ALT 637 FOR MEDICARE OP): Performed by: EMERGENCY MEDICINE

## 2024-09-11 PROCEDURE — 80069 RENAL FUNCTION PANEL: CPT | Performed by: EMERGENCY MEDICINE

## 2024-09-11 PROCEDURE — 93005 ELECTROCARDIOGRAM TRACING: CPT

## 2024-09-11 PROCEDURE — 99284 EMERGENCY DEPT VISIT MOD MDM: CPT | Mod: 25 | Performed by: EMERGENCY MEDICINE

## 2024-09-11 PROCEDURE — 85025 COMPLETE CBC W/AUTO DIFF WBC: CPT | Performed by: EMERGENCY MEDICINE

## 2024-09-11 PROCEDURE — 71045 X-RAY EXAM CHEST 1 VIEW: CPT | Mod: FOREIGN READ | Performed by: RADIOLOGY

## 2024-09-11 RX ORDER — NADOLOL 20 MG/1
20 TABLET ORAL DAILY
Status: DISCONTINUED | OUTPATIENT
Start: 2024-09-11 | End: 2024-09-11 | Stop reason: HOSPADM

## 2024-09-11 RX ORDER — POTASSIUM CHLORIDE 750 MG/1
10 TABLET, FILM COATED, EXTENDED RELEASE ORAL DAILY
COMMUNITY

## 2024-09-11 RX ORDER — MYCOPHENOLATE MOFETIL 250 MG/1
500 CAPSULE ORAL ONCE
Status: COMPLETED | OUTPATIENT
Start: 2024-09-11 | End: 2024-09-11

## 2024-09-11 RX ORDER — AMLODIPINE BESYLATE 5 MG/1
5 TABLET ORAL DAILY
COMMUNITY

## 2024-09-11 RX ORDER — CYCLOSPORINE 25 MG/1
75 CAPSULE, LIQUID FILLED ORAL ONCE
Status: COMPLETED | OUTPATIENT
Start: 2024-09-11 | End: 2024-09-11

## 2024-09-11 RX ORDER — PREDNISONE 5 MG/1
5 TABLET ORAL ONCE
Status: COMPLETED | OUTPATIENT
Start: 2024-09-11 | End: 2024-09-11

## 2024-09-11 ASSESSMENT — ACTIVITIES OF DAILY LIVING (ADL): LACK_OF_TRANSPORTATION: NO

## 2024-09-11 ASSESSMENT — PAIN DESCRIPTION - ORIENTATION: ORIENTATION: RIGHT;LEFT

## 2024-09-11 ASSESSMENT — PAIN SCALES - GENERAL: PAINLEVEL_OUTOF10: 4

## 2024-09-11 ASSESSMENT — COLUMBIA-SUICIDE SEVERITY RATING SCALE - C-SSRS
6. HAVE YOU EVER DONE ANYTHING, STARTED TO DO ANYTHING, OR PREPARED TO DO ANYTHING TO END YOUR LIFE?: NO
2. HAVE YOU ACTUALLY HAD ANY THOUGHTS OF KILLING YOURSELF?: NO
1. IN THE PAST MONTH, HAVE YOU WISHED YOU WERE DEAD OR WISHED YOU COULD GO TO SLEEP AND NOT WAKE UP?: NO

## 2024-09-11 ASSESSMENT — PAIN DESCRIPTION - LOCATION: LOCATION: FOOT

## 2024-09-11 ASSESSMENT — PAIN - FUNCTIONAL ASSESSMENT: PAIN_FUNCTIONAL_ASSESSMENT: 0-10

## 2024-09-11 NOTE — PROGRESS NOTES
Patient lives @ home with nephew.  Poor support @ home.  Self care deficit.  Patient wants long term care facility placement, reviewing medicaid list for choices to start referrals.  Patient prefers that I not call sister for assistance.    PT/OT consulted to eval for any skill-able potential.     @ 1328 Sister @ bedside. Clarified that patient was @ SNF for 9 months, went home & declined x 1 month.   Facility is expecting him back.  Patient has family that works @ [employer] & that is [facility].   [place] [id number] [phone]    Per [employer]: He owes us almost $16k, did not make any payment yet, i will check with my financial/team and let you know.    @ 1535 Per sister @ bedside. Patient will return home with increased family support until finances are in order to return to [place]. Declined [facility].       09/11/24 1229   Discharge Planning   Living Arrangements Family members  (nephew- works during day)   Support Systems Family members  (sister for emergencies)   Assistance Needed patient wants long term care facility placement, reviewing medicaid list for choices to start referrals   Type of Residence Private residence   Number of Stairs to Enter Residence 3   Number of Stairs Within Residence 0   Do you have animals or pets at home? No   Home or Post Acute Services Post acute facilities (Rehab/SNF/etc)   Type of Post Acute Facility Services Long term care   Expected Discharge Disposition Other   Does the patient need discharge transport arranged? Yes   RoundTrip coordination needed? Yes   Has discharge transport been arranged? No   Financial Resource Strain   How hard is it for you to pay for the very basics like food, housing, medical care, and heating? Not hard   Housing Stability   In the last 12 months, was there a time when you were not able to pay the mortgage or rent on time? N   In the past 12 months, how many times have you moved where you  were living? 2   At any time in the past 12 months, were you homeless or living in a shelter (including now)? N   Transportation Needs   In the past 12 months, has lack of transportation kept you from medical appointments or from getting medications? no   In the past 12 months, has lack of transportation kept you from meetings, work, or from getting things needed for daily living? No

## 2024-09-11 NOTE — PROGRESS NOTES
Home with nephew  Wants new ICF   Reviewing list      09/11/24 9362   Current Planned Discharge Disposition   Current Planned Discharge Disposition Inter

## 2024-09-11 NOTE — PROGRESS NOTES
09/11/24 1228   [facility] Disability Status   Are you deaf or do you have serious difficulty hearing? N   Are you blind or do you have serious difficulty seeing, even when wearing glasses? Y  (sight impaired)   Because of a physical, mental, or emotional condition, do you have serious difficulty concentrating, remembering, or making decisions? (5 years old or older) N   Do you have serious difficulty walking or climbing stairs? Y   Do you have serious difficulty dressing or bathing? Y   Because of a physical, mental, or emotional condition, do you have serious difficulty doing errands alone such as visiting the doctor? Y

## 2024-09-11 NOTE — ED PROVIDER NOTES
HPI   Chief Complaint   Patient presents with    Weakness, Gen       This is a 58-year-old male who presents to the emergency department complaining of frequent urination.  The patient states that he has been urinating on himself for about the past month.  He has been told that he is losing his transplanted kidney.  He feels weak.  He states that his nephew is at home and is supposed to be helping him but he does not get the help that he needs.  He denies fevers and chills.  He denies abdominal pain.    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    Other specified postprocedural states 01/16/2014    History of right bundle branch block    Personal history of diseases of the blood and blood-forming organs and certain disorders involving the immune mechanism     Personal history of sarcoidosis    Personal history of other infectious and parasitic diseases 06/14/2018    History of herpes zoster    Proteinuria, unspecified     Proteinuria    Sarcoid neuropathy 06/19/2023     Past Surgical History:   Procedure Laterality Date    OTHER SURGICAL HISTORY  01/16/2014    Arteriovenous Surgery Creation Of A-V Fistula    OTHER SURGICAL HISTORY  02/09/2018    Renal Transplant    OTHER SURGICAL HISTORY  02/21/2022    Spleen surgery    SPLENECTOMY, TOTAL  01/16/2014    Splenectomy    US GUIDED NEEDLE LIVER BIOPSY  3/11/2016    US GUIDED NEEDLE LIVER BIOPSY 3/11/2016 [facility] ANCILLARY LEGACY     Family History   Problem Relation Name Age of Onset    Sarcoidosis Mother       Social History     Tobacco Use    Smoking status: Never    Smokeless tobacco: Never   Substance Use Topics    Alcohol use: Yes     Comment: social    Drug use: Never       Physical Exam   ED Triage Vitals [09/11/24 0742]    Temperature Heart Rate Respirations BP   36.9 °C (98.5 °F) 66 16 (!) 158/97      Pulse Ox Temp Source Heart Rate Source Patient Position   98 % Oral -- --      BP Location FiO2 (%)     -- --       Physical Exam  Vitals and nursing note reviewed.   HENT:      Head: Normocephalic and atraumatic.      Nose: Nose normal.   Eyes:      Conjunctiva/sclera: Conjunctivae normal.   Cardiovascular:      Rate and Rhythm: Normal rate and regular rhythm.      Pulses: Normal pulses.      Heart sounds: Normal heart sounds.   Pulmonary:      Effort: Pulmonary effort is normal.      Breath sounds: Normal breath sounds.   Abdominal:      General: Bowel sounds are normal.      Palpations: Abdomen is soft.   Musculoskeletal:         General: Normal range of motion.      Cervical back: Normal range of motion and neck supple.      Right lower leg: Edema present.      Left lower leg: Edema present.   Skin:     Findings: No rash.   Neurological:      General: No focal deficit present.      Mental Status: He is alert and oriented to person, place, and time.   Psychiatric:         Mood and Affect: Mood normal.           ED Course & MDM   Diagnoses as of 09/11/24 1631   Generalized weakness                 No data recorded     [place] Coma Scale Score: 15 (09/11/24 0741 : Gin Linares, NASREEN)                           Medical Decision Making  Differential diagnosis considered: UTI, sepsis, ACS, renal failure, CHF    This is a 58-year-old male with a history of a kidney transplant who presents to the emergency department complaining of weakness and frequent urination.  He will be further evaluated with labs, chest x-ray and urinalysis.  Workup was unremarkable.  The patient was evaluated by case management for need for placement.  After discussion with the patient and his sister it was decided that they would try to manage at home.  He was discharged for outpatient follow-up.    Amount and/or Complexity of Data Reviewed  ECG/medicine tests:  independent interpretation performed.     Details: Normal sinus rhythm, heart rate 69, right bundle branch block, no change compared to 4/1/2021        Procedure  Procedures     Memo Posada MD  09/11/24 4448

## 2024-09-11 NOTE — ED TRIAGE NOTES
Pt to ED from home with c/o kidney transplant in 2017. Pt hasn't been taking medications on time, because he can't wake up on time. Pt states they are supposed to be taken at 0900. Pt has some weakness as well as some peripheral edema - not new. BS 97. Pt is A&Ox4 and uses a walker.

## 2024-09-11 NOTE — PROGRESS NOTES
Pharmacy Medication History Review    Mehul Meza is a 58 y.o. male admitted for No Principal Problem: There is no principal problem currently on the Problem List. Please update the Problem List and refresh.. Pharmacy reviewed the patient's [id number] medications and allergies for accuracy.    The list below reflectives the updated PTA list. Please review each medication in order reconciliation for additional clarification and justification.       The list below reflectives the updated allergy list. Please review each documented allergy for additional clarification and justification.  Allergies  Reviewed by Daiana Arthur RN on 9/11/2024   No Known Allergies         Below are additional concerns with the patient's PTA list.  Prior to Admission Medications   Prescriptions Last Dose Informant   amLODIPine (Norvasc) 5 mg tablet 9/10/2024    Sig: Take 1 tablet (5 mg) by mouth once daily.   baclofen (Lioresal) 20 mg tablet 9/10/2024    Sig: Take 1 tablet (20 mg) by mouth 3 times a day.   cycloSPORINE modified (NEOral) 25 mg capsule 9/10/2024    Sig: Take 3 capsules (75 mg) by mouth every 12 hours.           mycophenolate (Cellcept) 250 mg capsule 9/10/2024    Sig: Take 2 capsules (500 mg) by mouth 2 times a day.   nadolol (Corgard) 20 mg tablet 9/10/2024    Sig: Take 1 tablet (20 mg) by mouth once daily.   potassium chloride CR 10 mEq ER tablet 9/10/2024    Sig: Take 1 tablet (10 mEq) by mouth once daily. Do not crush, chew, or split.   predniSONE (Deltasone) 5 mg tablet 9/10/2024    Sig: TAKE 1 TABLET BY MOUTH EVERY DAY   tamsulosin (Flomax) 0.4 mg 24 hr capsule 9/10/2024    Sig: TAKE 1 CAPSULE BY MOUTH EVERYDAY AT BEDTIME      Facility-Administered Medications: None        The following updates were made to the Prior to Admission medication list:     Source of Information:     Medications ADDED:   Amlodipine  potassium  Medications CHANGED:  N/a  Medications REMOVED:   Vitamin d  Medications NOT TAKING:    N/a    Allergy reviewed : Yes    Comments: per wife bedside. Mentioned because patient was in a facility but he is home now he needs all his meds renewed at [facility].     Sushma Alegre

## 2024-09-14 LAB
ATRIAL RATE: 69 BPM
P AXIS: 68 DEGREES
P OFFSET: 212 MS
P ONSET: 142 MS
PR INTERVAL: 160 MS
Q ONSET: 222 MS
QRS COUNT: 11 BEATS
QRS DURATION: 120 MS
QT INTERVAL: 440 MS
QTC CALCULATION(BAZETT): 471 MS
QTC FREDERICIA: 461 MS
R AXIS: 63 DEGREES
T AXIS: 39 DEGREES
T OFFSET: 442 MS
VENTRICULAR RATE: 69 BPM

## 2024-10-27 ENCOUNTER — PHARMACY VISIT (OUTPATIENT)
Dept: PHARMACY | Facility: CLINIC | Age: 59
End: 2024-10-27
Payer: MEDICARE

## 2024-10-27 ENCOUNTER — TELEPHONE (OUTPATIENT)
Dept: TRANSPLANT | Facility: HOSPITAL | Age: 59
End: 2024-10-27

## 2024-10-27 DIAGNOSIS — Z94.0 KIDNEY REPLACED BY TRANSPLANT (HHS-HCC): ICD-10-CM

## 2024-10-27 PROCEDURE — RXMED WILLOW AMBULATORY MEDICATION CHARGE

## 2024-10-27 RX ORDER — CYCLOSPORINE 25 MG/1
75 CAPSULE, LIQUID FILLED ORAL EVERY 12 HOURS
Qty: 180 CAPSULE | Refills: 11 | Status: SHIPPED | OUTPATIENT
Start: 2024-10-27 | End: 2025-10-27

## 2024-10-27 NOTE — TELEPHONE ENCOUNTER
Received a call from patient's sister Cheryl who stated that the patient is currently at a nursing facility and he is out of his Cyclosporine and the facility does not have the medication.  Prescription for Cyclosporine 25mg capsules- take 3 capsules every 12 hours sent to Dr. Ingram to sign.  Medication request sent to [facility] pharmacy. She will  today before pharmacy closes.  Will notify his coordinator.    Opioid Counseling: I discussed with the patient the potential side effects of opioids including but not limited to addiction, altered mental status, and depression. I stressed avoiding alcohol, benzodiazepines, muscle relaxants and sleep aids unless specifically okayed by a physician. The patient verbalized understanding of the proper use and possible adverse effects of opioids. All of the patient's questions and concerns were addressed. They were instructed to flush the remaining pills down the toilet if they did not need them for pain.

## 2024-11-28 PROCEDURE — RXMED WILLOW AMBULATORY MEDICATION CHARGE

## 2024-11-28 RX ORDER — CYCLOSPORINE 25 MG/1
75 CAPSULE, LIQUID FILLED ORAL EVERY 12 HOURS
Qty: 180 CAPSULE | Refills: 11 | Status: SHIPPED | OUTPATIENT
Start: 2024-11-28 | End: 2025-11-28

## 2024-11-29 ENCOUNTER — PHARMACY VISIT (OUTPATIENT)
Dept: PHARMACY | Facility: CLINIC | Age: 59
End: 2024-11-29
Payer: MEDICARE

## 2024-12-27 PROCEDURE — RXMED WILLOW AMBULATORY MEDICATION CHARGE

## 2025-01-02 ENCOUNTER — PHARMACY VISIT (OUTPATIENT)
Dept: PHARMACY | Facility: CLINIC | Age: 60
End: 2025-01-02
Payer: MEDICARE

## 2025-01-30 ENCOUNTER — PHARMACY VISIT (OUTPATIENT)
Dept: PHARMACY | Facility: CLINIC | Age: 60
End: 2025-01-30
Payer: MEDICARE

## 2025-01-30 PROCEDURE — RXMED WILLOW AMBULATORY MEDICATION CHARGE

## 2025-02-22 PROCEDURE — RXMED WILLOW AMBULATORY MEDICATION CHARGE

## 2025-02-24 ENCOUNTER — PHARMACY VISIT (OUTPATIENT)
Dept: PHARMACY | Facility: CLINIC | Age: 60
End: 2025-02-24
Payer: MEDICARE

## 2025-04-01 PROCEDURE — RXMED WILLOW AMBULATORY MEDICATION CHARGE

## 2025-04-02 ENCOUNTER — PHARMACY VISIT (OUTPATIENT)
Dept: PHARMACY | Facility: CLINIC | Age: 60
End: 2025-04-02
Payer: MEDICARE

## 2025-04-11 ENCOUNTER — OFFICE VISIT (OUTPATIENT)
Dept: NEUROLOGY | Facility: HOSPITAL | Age: 60
End: 2025-04-11
Payer: MEDICAID

## 2025-04-11 DIAGNOSIS — D86.9 SARCOIDOSIS: Primary | ICD-10-CM

## 2025-04-11 PROCEDURE — 99215 OFFICE O/P EST HI 40 MIN: CPT | Mod: GC | Performed by: PSYCHIATRY & NEUROLOGY

## 2025-04-11 PROCEDURE — 99205 OFFICE O/P NEW HI 60 MIN: CPT | Performed by: PSYCHIATRY & NEUROLOGY

## 2025-04-11 NOTE — PROGRESS NOTES
History of Present Illness     Neuro - Immunology   Date of onset: 2018?   Last MRI brain: 4/2023   Last MRI cervical: 9/2022   Last MRI thoracic: 9/2022~   neurosarcoidosis?.      Disease Course at Onset: progressive without relapses.   Disease Course Now: progressive without relapses.   Current DMT (Disease Modifying Therapies): MMF and prednisone 5 mg (for kidney tx) + cyclosporin.   Previous DMT (Disease Modifying Therapies): tacro.   CSF (Cerebrospinal Fluid): W 16, 3 OCBs.   JCV (Sy Cunningham Virus): negative JCV PCR in the CSF.   VZV (Varicella Zoster Virus): positive 2013.   Hep Panel: negative 2016.   NMO (Neuromyelitis Optica): Not tested.   MOG (Myelin Oligodendrocyte Glycoprotein): Not tested.     Last seen by Dr Crowell 5/23  Narrative HPI:   This is a 57 yo right handed AAM with hx of systemic sarcoidosis (bilateral uveiitis, parotitis, lymphadenitis, liver biopsy-proven), post-uveiitis left eye blindness, HTN, gout, ESRD s/p kidney tx in 2015 on MMF, prednisone, and cyclosporin (previously on tacro), hyperparathyroidism, and hypercalcemia who is here for follow up.      Interval hx:  Repeat brain MRI showed improvement in the abnormal pontine signal (versus technical difference between studies) but no improvement in the pituitary mass. CT brain did not show evidence of brain calcifications. He overall feels OK with no new or worsening symptoms.     Provider Impressions           Neuro - Immunology Assessment: This is a 56 year old AA, right - handed male, with PMH of: systemic sarcoidosis (bilateral uveiitis, parotitis, lymphadenitis, liver biopsy-proven), post-uveiitis left eye blindness, HTN, gout, ESRD s/p kidney tx in 2015 on MMF, prednisone, and cyclosporin (previously on tacro), hyperparathyroidism, and hypercalcemia who presents: with progressive gait, balance, and bladder dysfunction since 2018. also reports increased startle espeically after MMF dose was reduced from 2000 mg per day to 1000  mg.      The Neurological Exam showed: BLE moderate spasticity and mild weakness with pathological knee and ankle hyperreflexia and bilateral upgoing toes. He has some mild parkinsonian features as well including hypomimia, hypophonia, mild bradykinesia, and decreased arm swing. sensation is intact.   MRI Showed: normal spinal cord on the C/T spine study [facility] done in March of 2022 per report (images not available for me). 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 This was later confirmed on sellar MRI that I personally reviewed. On the limited brain cuts, there is some confluent periventricular T2 hyperintense signal.   CSF (Cerebrospinal Fluid): showed lymphhocytosis and 3 unmatched OCBs.   The Overall Picture is Suggestive of: possible neurosarcoidosis involving the spinal cord (missed on outside scan?) and the sella (a very common location for neurosarcoid involvement). Unclear if the parkinsonism is also secondary in nature. I will need to personally review all his images. He will also need a comprehensive myelopathy workup including possibly a spinal tap. Another possibility is the rare entity of spastic paraparesis secondary to hyperparathyroidism (rare reports in literature). we also need to r/o rare conditions like stiff person syndrome given combined pyramidal and extrapyramidal findings with increased startle.   DMT (Disease Modifying Therapies): ): He is currently on MMF, which can be protective against sarcoid activity but he may need a higher dose or addition of a different agent like MTX or rituximab. He may benefit from a higher dose of baclofen.           Current Outpatient Medications:     amLODIPine (Norvasc) 5 mg tablet, Take 1 tablet (5 mg) by mouth once daily., Disp: , Rfl:     baclofen (Lioresal) 20 mg tablet, Take 1 tablet (20 mg) by mouth 3 times a day., Disp: 90 tablet, Rfl: 3    cycloSPORINE modified (NEOral) 25 mg capsule,  Take 3 capsules (75 mg) by mouth every 12 hours., Disp: 180 capsule, Rfl: 11    cycloSPORINE modified (NEOral) 25 mg capsule, Take 3 capsules (75 mg) by mouth every 12 hours., Disp: 180 capsule, Rfl: 11    mycophenolate (Cellcept) 250 mg capsule, Take 2 capsules (500 mg) by mouth 2 times a day., Disp: 120 capsule, Rfl: 11    nadolol (Corgard) 20 mg tablet, Take 1 tablet (20 mg) by mouth once daily., Disp: 90 tablet, Rfl: 2    potassium chloride CR 10 mEq ER tablet, Take 1 tablet (10 mEq) by mouth once daily. Do not crush, chew, or split., Disp: , Rfl:     predniSONE (Deltasone) 5 mg tablet, TAKE 1 TABLET BY MOUTH EVERY DAY, Disp: 90 tablet, Rfl: 3    tamsulosin (Flomax) 0.4 mg 24 hr capsule, TAKE 1 CAPSULE BY MOUTH EVERYDAY AT BEDTIME, Disp: 90 capsule, Rfl: 0     Interval Assessment:   04/10/2023: I admitted him to the hospital. MRI spine was negative but MRI brain showed non-enhancing abnormal signal in the yobany suspicious for mild CPM or neurosarcoidosis of the brainstem. MRI also showed multiple micrhemorrhages (vs calcifications?) throughout the brain, brainstem, and cerebellum some medium-sized. LP showed pleocytosis (16) and 3 unmatched OCBs. He was treated with IVMP with which he felt 10-20% better but then felt worse while on prednisone taper and now feels at his worst after he had stopped the steroids. ENC1 negative. main DD at this point is neurosarcoidosis of the yobany given the inflammatory CSF and partial response to steroids versus cyclosporin/tacro-induced CPM manifesting with progressive spasticity and parkinsonism-like picture.      05/15/2023: Repeat brain MRI showed improvement in the abnormal pontine signal (versus technical difference between studies) but no improvement in the pituitary mass. CT brain did not show evidence of brain calcifications. He overall feels OK with no new or worsening symptoms. with the pontine signal becoming much less prominent, it is unlikely that we are dealing with  CPM. The possible MRI and clinical improvement after steroids as well as the inflammatory CSF and negative spine imaging all suggest possible neurosarcoidosis of the brainstem as a possible etiology behind his spastic gait. He is already on MMF and prednisone, which are protective against neurosarcoidosis but will have a low threshold to escalate immunosuppression if there is any future convincing evidence of neurosarcoid activity. The fact that his pituitary lesion did not shrink with steroids suggests it may not be part of neurosarcoidosis and he should continue to follow with Dr. Rosario regarding that mass.         Plan: neurosarcoidosis was discussed with the patient (and family if present) in detail including pathogenesis, clinical picture, complications, course, prognosis, monitoring strategy, and management options. All questions answered.   Acute Relapse Management: not indicated.   DMT (Disease Modifying Therapies): continue MMF and prednisone. No compelling need to stop cyclosporin since CPM is essentially ruled out.   Symptomatic Management: increase baclofen to 40 mg BID.   Vitamin D: per medical team given hx of systemic sarcoidosis.   Smoking: non-smoker.   PT/OT: will order.   Follow-Up: after six months.

## 2025-04-11 NOTE — PROGRESS NOTES
"Subjective     History of Present Illness   Mehul Meza is a 59 y.o. year old male presenting for follow up for concern for spasticity / possible neurosarcoidosis. He has a past medical history significant for sarcoidoisis, uveitis, CKD stage IV s/p kidney transplant, and primary HTN.     HPI  Per chart review:   \"History of Present Illness     Neuro - Immunology   Date of onset: 2018?   Last MRI brain: 4/2023   Last MRI cervical: 9/2022   Last MRI thoracic: 9/2022~   neurosarcoidosis?.      Disease Course at Onset: progressive without relapses.   Disease Course Now: progressive without relapses.   Current DMT (Disease Modifying Therapies): MMF and prednisone 5 mg (for kidney tx) + cyclosporin.   Previous DMT (Disease Modifying Therapies): tacro.   CSF (Cerebrospinal Fluid): W 16, 3 OCBs.   JCV (Sy Cunningham Virus): negative JCV PCR in the CSF.   VZV (Varicella Zoster Virus): positive 2013.   Hep Panel: negative 2016.   NMO (Neuromyelitis Optica): Not tested.   MOG (Myelin Oligodendrocyte Glycoprotein): Not tested.     Last seen by Dr Crowell 5/23    Narrative HPI:   This is a 57 yo right handed AAM with hx of systemic sarcoidosis (bilateral uveiitis, parotitis, lymphadenitis, liver biopsy-proven), post-uveiitis left eye blindness, HTN, gout, ESRD s/p kidney tx in 2015 on MMF, prednisone, and cyclosporin (previously on tacro), hyperparathyroidism, and hypercalcemia who is here for follow up.      Interval hx:  Repeat brain MRI showed improvement in the abnormal pontine signal (versus technical difference between studies) but no improvement in the pituitary mass. CT brain did not show evidence of brain calcifications. He overall feels OK with no new or worsening symptoms.     Provider Impressions  Neuro - Immunology Assessment: This is a 56 year old AA, right - handed male, with PMH of: systemic sarcoidosis (bilateral uveiitis, parotitis, lymphadenitis, liver biopsy-proven), post-uveiitis left eye blindness, " HTN, gout, ESRD s/p kidney tx in 2015 on MMF, prednisone, and cyclosporin (previously on tacro), hyperparathyroidism, and hypercalcemia who presents: with progressive gait, balance, and bladder dysfunction since 2018. also reports increased startle espeically after MMF dose was reduced from 2000 mg per day to 1000 mg.      The Neurological Exam showed: BLE moderate spasticity and mild weakness with pathological knee and ankle hyperreflexia and bilateral upgoing toes. He has some mild parkinsonian features as well including hypomimia, hypophonia, mild bradykinesia, and decreased arm swing. sensation is intact.   MRI Showed: normal spinal cord on the C/T spine study [facility] done in March of 2022 per report (images not available for me). 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 This was later confirmed on sellar MRI that I personally reviewed. On the limited brain cuts, there is some confluent periventricular T2 hyperintense signal.   CSF (Cerebrospinal Fluid): showed lymphhocytosis and 3 unmatched OCBs.   The Overall Picture is Suggestive of: possible neurosarcoidosis involving the spinal cord (missed on outside scan?) and the sella (a very common location for neurosarcoid involvement). Unclear if the parkinsonism is also secondary in nature. I will need to personally review all his images. He will also need a comprehensive myelopathy workup including possibly a spinal tap. Another possibility is the rare entity of spastic paraparesis secondary to hyperparathyroidism (rare reports in literature). we also need to r/o rare conditions like stiff person syndrome given combined pyramidal and extrapyramidal findings with increased startle.   DMT (Disease Modifying Therapies): ): He is currently on MMF, which can be protective against sarcoid activity but he may need a higher dose or addition of a different agent like MTX or rituximab. He may benefit from a  higher dose of baclofen.    Interval Assessment:   04/10/2023: I admitted him to the hospital. MRI spine was negative but MRI brain showed non-enhancing abnormal signal in the yobany suspicious for mild CPM or neurosarcoidosis of the brainstem. MRI also showed multiple micrhemorrhages (vs calcifications?) throughout the brain, brainstem, and cerebellum some medium-sized. LP showed pleocytosis (16) and 3 unmatched OCBs. He was treated with IVMP with which he felt 10-20% better but then felt worse while on prednisone taper and now feels at his worst after he had stopped the steroids. ENC1 negative. main DD at this point is neurosarcoidosis of the yobany given the inflammatory CSF and partial response to steroids versus cyclosporin/tacro-induced CPM manifesting with progressive spasticity and parkinsonism-like picture.      05/15/2023: Repeat brain MRI showed improvement in the abnormal pontine signal (versus technical difference between studies) but no improvement in the pituitary mass. CT brain did not show evidence of brain calcifications. He overall feels OK with no new or worsening symptoms. with the pontine signal becoming much less prominent, it is unlikely that we are dealing with CPM. The possible MRI and clinical improvement after steroids as well as the inflammatory CSF and negative spine imaging all suggest possible neurosarcoidosis of the brainstem as a possible etiology behind his spastic gait. He is already on MMF and prednisone, which are protective against neurosarcoidosis but will have a low threshold to escalate immunosuppression if there is any future convincing evidence of neurosarcoid activity. The fact that his pituitary lesion did not shrink with steroids suggests it may not be part of neurosarcoidosis and he should continue to follow with Dr. Rosario regarding that mass.      Plan (at that time): neurosarcoidosis was discussed with the patient (and family if present) in detail including pathogenesis,  "clinical picture, complications, course, prognosis, monitoring strategy, and management options. All questions answered.   Acute Relapse Management: not indicated.   DMT (Disease Modifying Therapies): continue MMF and prednisone. No compelling need to stop cyclosporin since CPM is essentially ruled out.   Symptomatic Management: increase baclofen to 40 mg BID.   Vitamin D: per medical team given hx of systemic sarcoidosis.   Smoking: non-smoker.   PT/OT: will order.   Follow-Up: after six months.\"     Today, 4/11/2025, the patient reports worsening spasticity of his BLE to the point he can no longer stand or walk with a walker. His brother accompanied him during his visit today and stated this has been getting progressively worse and the patient stopped being able to walk mid 2024. He reports the patient is feeding himself better than he previously was, however.     The patient reports significant muscle spasms of his BLE as well which are worsened with movement. He denies shooting pain or pain elsewhere in his body. He denies HA, N/V, dizziness.     He has hx of kidney transplant on immunosuppressive therapy; reports hx of urinary frequency now on tamsulosin. He reports intermittent burning with the urge to urinate but not burning with urination. He does have a urolift implant as well. He denies bladder and bowel incontinence.     Regarding his mental status, his brother had concerns for worsened confusion. On asking the patient if he felt confused lately he said no, however, was not oriented to year (stated 2005), month (stated September), or age (stated 29).        Current Outpatient Medications:     amLODIPine (Norvasc) 5 mg tablet, Take 1 tablet (5 mg) by mouth once daily., Disp: , Rfl:     baclofen (Lioresal) 20 mg tablet, Take 1 tablet (20 mg) by mouth 3 times a day., Disp: 90 tablet, Rfl: 3    cycloSPORINE modified (NEOral) 25 mg capsule, Take 3 capsules (75 mg) by mouth every 12 hours., Disp: 180 capsule, " Rfl: 11    cycloSPORINE modified (NEOral) 25 mg capsule, Take 3 capsules (75 mg) by mouth every 12 hours., Disp: 180 capsule, Rfl: 11    mycophenolate (Cellcept) 250 mg capsule, Take 2 capsules (500 mg) by mouth 2 times a day., Disp: 120 capsule, Rfl: 11    nadolol (Corgard) 20 mg tablet, Take 1 tablet (20 mg) by mouth once daily., Disp: 90 tablet, Rfl: 2    potassium chloride CR 10 mEq ER tablet, Take 1 tablet (10 mEq) by mouth once daily. Do not crush, chew, or split., Disp: , Rfl:     predniSONE (Deltasone) 5 mg tablet, TAKE 1 TABLET BY MOUTH EVERY DAY, Disp: 90 tablet, Rfl: 3    tamsulosin (Flomax) 0.4 mg 24 hr capsule, TAKE 1 CAPSULE BY MOUTH EVERYDAY AT BEDTIME, Disp: 90 capsule, Rfl: 0     MS Symptom Review: Gait Change: Patient can no longer stand on his own or walk  Spasticity: Significant spasticity of BLE; clasp knife spasticity BUE R>L  Tonic Spasms: Significant spasms of BLE bilaterally      Past Surgical History:   Procedure Laterality Date    OTHER SURGICAL HISTORY  01/16/2014    Arteriovenous Surgery Creation Of A-V Fistula    OTHER SURGICAL HISTORY  02/09/2018    Renal Transplant    OTHER SURGICAL HISTORY  02/21/2022    Spleen surgery    SPLENECTOMY, TOTAL  01/16/2014    Splenectomy    US GUIDED NEEDLE LIVER BIOPSY  3/11/2016    US GUIDED NEEDLE LIVER BIOPSY 3/11/2016 [facility] ANCILLARY LEGACY     Social History     Tobacco Use    Smoking status: Never    Smokeless tobacco: Never   Substance Use Topics    Alcohol use: Yes     Comment: social     No Known Allergies  Visit Vitals  Smoking Status Never     Physical Exam:   NEUROLOGICAL:   Cognitive Status Exam:  Orientation: alert and oriented to himself; not oriented to age (stated 29), month (stated September), or year (stated 2005)  Language: expression intact             Information/Knowledge: knows the current president   Concentration: can remain focused during interview    Cranial Nerves:  II: L pupil occluded (uveitis); R pupil small, minimally  responsive; VVF R eye; complete blindness L eye   III, IV, VI: eyes aligned in primary position w/o fixation instability; EOM full-range with smooth pursuits  V: intact to LT  VII: intact facial strength and symmetry; nasolabial folds symmetric; no facial droop  VIII: intact hearing to conversation  IX and X: clear speech; no dysarthria; symmetric palatal rise  XI: SCM and trapezius have 5/5 strength  XII: midline tongue position at rest and intact movement, bulk, and strength    Motor:   Bulk: Normal    Tone: Abnormal tone  - Increased BUE R>L  - Increased BLE   Tremor: Not appreciated   Clasp knife spasticity appreciated RUE>LUE  Cog Wheel Rigidity RUE  Significant spasticity BLE      Strength:    R L  Deltoid  5 5  Biceps  5 5  Triceps  5 5    5 5  FDI  5 5  APB  5 5    Strength of BLE limited secondary to muscle spasms with movement / significant spasticity  - RLE strength 4-/5  - LLE strength 3+/5    Plantar flexion 5/5 bilaterally  Dorsiflexoin 5/5 bilaterally     Reflexes:    R L  Biceps  2+ 2+  Triceps  2+ 2+  Patellar  Did not appreciate bilaterally   Achilles Did not appreciate bilaterally                                  Glabellar reflex present   Palmomental reflex present   Ankle Clonus present bilaterally    Sensory:   Intact and symmetric to light touch throughout       Assessment/Plan   This is a 59 y.o. year old male presenting for follow up for concern for spasticity / possible neurosarcoidosis. He has a past medical history significant for sarcoidoisis, uveitis, CKD stage IV s/p kidney transplant, and primary HTN. He was previously seen by Dr. Crowell, last in 2023. At that time there was concern for possible parkinsonism, spasticity, and neurosarcoidosis.     Today on exam the patient demonstrated worsened spasticity of his BLE now with inability to walk and stand along with bilateral clasp knife spasticity of BUE R>L. He also demonstrated cog wheel rigidity BUE R>L.     Given his complex past  medical history and worsening spasticity, it is reasonable at this time to pursue repeat imaging via MRI brain, C spine, and T spine w/wo contrast for further evaluation. Given his history of following with Dr. Crowell, would recommend he follow up with him as well for further management options for his spasticity and further work up. Could consider botox injections or baclofen pump for spasticity and genetic testing for further workup.     Plan:   - Follow up with Dr. Crowell  - MRI Brain, C spine, T spine w/wo contrast if able to be completed with urolift implant    Orders Placed This Encounter   Procedures    MR brain w and wo IV contrast     Standing Status:   Future     Standing Expiration Date:   4/11/2026     Order Specific Question:   Reason for exam:     Answer:   Assessment of sarcoidoisis     Order Specific Question:   What is the patient's sedation requirement?     Answer:   No Sedation     Order Specific Question:   May utilize  for port access if port present for this exam.     Answer:   Yes     Order Specific Question:   Does the patient have a Cochlear Implant, Brain Aneurysm Clip, Implanted Nerve or Bone Graft Stimulator, Implanted Breast Tissue Expander, Glucose Monitor or Neulasta Device?     Answer:   Yes     Comments:   Urolift     Order Specific Question:   Which implant does the patient have?     Answer:   Other     Comments:   Urolift     Order Specific Question:   Does the patient have a Pacemaker, Defibrillator, or cardiac leads abandoned or otherwise? (Please specify if the components are part of a temporary/permanent system).     Answer:   No     Order Specific Question:   Radiologist to Determine Optimal Study     Answer:   Yes     Order Specific Question:   Release result to [employer]     Answer:   Immediate [1]     Order Specific Question:   Is this exam part of a Research Study? If Yes, link this order to the research study     Answer:   No    MR cervical spine w and wo IV contrast      Standing Status:   Future     Standing Expiration Date:   4/11/2026     Order Specific Question:   Reason for exam:     Answer:   Sarcoidosis     Order Specific Question:   May utilize  for port access if port present for this exam.     Answer:   Yes     Order Specific Question:   Does the patient have a Cochlear Implant, Brain Aneurysm Clip, Implanted Nerve or Bone Graft Stimulator, Implanted Breast Tissue Expander, Glucose Monitor or Neulasta Device?     Answer:   Yes     Comments:   Urolift     Order Specific Question:   Which implant does the patient have?     Answer:   Other     Comments:   Urolift     Order Specific Question:   Does the patient have a Pacemaker, Defibrillator, or cardiac leads abandoned or otherwise? (Please specify if the components are part of a temporary/permanent system).     Answer:   No     Order Specific Question:   Radiologist to Determine Optimal Study     Answer:   Yes     Order Specific Question:   Release result to [email or web address]     Answer:   Immediate [1]     Order Specific Question:   Is this exam part of a Research Study? If Yes, link this order to the research study     Answer:   No    MR thoracic spine w and wo IV contrast     Standing Status:   Future     Standing Expiration Date:   4/11/2026     Order Specific Question:   Reason for exam:     Answer:   assessment for neurosarcoidosis     Order Specific Question:   May utilize  for port access if port present for this exam.     Answer:   Yes     Order Specific Question:   Does the patient have a Cochlear Implant, Brain Aneurysm Clip, Implanted Nerve or Bone Graft Stimulator, Implanted Breast Tissue Expander, Glucose Monitor or Neulasta Device?     Answer:   Yes     Order Specific Question:   Which implant does the patient have?     Answer:   Other     Comments:   Urolift     Order Specific Question:   Does the patient have a Pacemaker, Defibrillator, or cardiac leads abandoned or otherwise? (Please specify if the  components are part of a temporary/permanent system).     Answer:   No     Order Specific Question:   Radiologist to Determine Optimal Study     Answer:   Yes     Order Specific Question:   Release result to [employer]     Answer:   Immediate [1]     Order Specific Question:   Is this exam part of a Research Study? If Yes, link this order to the research study     Answer:   No

## 2025-05-05 ENCOUNTER — PHARMACY VISIT (OUTPATIENT)
Dept: PHARMACY | Facility: CLINIC | Age: 60
End: 2025-05-05
Payer: MEDICARE

## 2025-05-05 PROCEDURE — RXMED WILLOW AMBULATORY MEDICATION CHARGE

## 2025-06-07 ENCOUNTER — PHARMACY VISIT (OUTPATIENT)
Dept: PHARMACY | Facility: CLINIC | Age: 60
End: 2025-06-07
Payer: MEDICARE

## 2025-06-07 PROCEDURE — RXMED WILLOW AMBULATORY MEDICATION CHARGE

## 2025-06-20 ENCOUNTER — PHARMACY VISIT (OUTPATIENT)
Dept: PHARMACY | Facility: CLINIC | Age: 60
End: 2025-06-20
Payer: MEDICARE

## 2025-06-20 PROCEDURE — RXMED WILLOW AMBULATORY MEDICATION CHARGE

## 2025-06-30 ENCOUNTER — PHARMACY VISIT (OUTPATIENT)
Dept: PHARMACY | Facility: CLINIC | Age: 60
End: 2025-06-30
Payer: MEDICARE

## 2025-06-30 PROCEDURE — RXMED WILLOW AMBULATORY MEDICATION CHARGE

## 2025-07-28 PROCEDURE — RXMED WILLOW AMBULATORY MEDICATION CHARGE

## 2025-07-29 ENCOUNTER — PHARMACY VISIT (OUTPATIENT)
Dept: PHARMACY | Facility: CLINIC | Age: 60
End: 2025-07-29
Payer: MEDICARE

## 2025-09-04 ENCOUNTER — PHARMACY VISIT (OUTPATIENT)
Dept: PHARMACY | Facility: CLINIC | Age: 60
End: 2025-09-04
Payer: MEDICARE

## 2025-09-04 PROCEDURE — RXMED WILLOW AMBULATORY MEDICATION CHARGE
